# Patient Record
Sex: MALE | Race: BLACK OR AFRICAN AMERICAN | NOT HISPANIC OR LATINO | URBAN - METROPOLITAN AREA
[De-identification: names, ages, dates, MRNs, and addresses within clinical notes are randomized per-mention and may not be internally consistent; named-entity substitution may affect disease eponyms.]

---

## 2022-10-26 ENCOUNTER — INPATIENT (INPATIENT)
Facility: HOSPITAL | Age: 52
LOS: 2 days | Discharge: HOME | End: 2022-10-29
Attending: HOSPITALIST | Admitting: HOSPITALIST
Payer: MEDICAID

## 2022-10-26 VITALS
HEART RATE: 67 BPM | OXYGEN SATURATION: 100 % | RESPIRATION RATE: 18 BRPM | DIASTOLIC BLOOD PRESSURE: 92 MMHG | SYSTOLIC BLOOD PRESSURE: 170 MMHG | TEMPERATURE: 98 F

## 2022-10-26 LAB
ALBUMIN SERPL ELPH-MCNC: 4.9 G/DL — SIGNIFICANT CHANGE UP (ref 3.5–5.2)
ALP SERPL-CCNC: 61 U/L — SIGNIFICANT CHANGE UP (ref 30–115)
ALT FLD-CCNC: 10 U/L — SIGNIFICANT CHANGE UP (ref 0–41)
ANION GAP SERPL CALC-SCNC: 13 MMOL/L — SIGNIFICANT CHANGE UP (ref 7–14)
APTT BLD: 35 SEC — SIGNIFICANT CHANGE UP (ref 27–39.2)
AST SERPL-CCNC: 18 U/L — SIGNIFICANT CHANGE UP (ref 0–41)
BASE EXCESS BLDV CALC-SCNC: 3.8 MMOL/L — HIGH (ref -2–3)
BASOPHILS # BLD AUTO: 0.04 K/UL — SIGNIFICANT CHANGE UP (ref 0–0.2)
BASOPHILS NFR BLD AUTO: 0.5 % — SIGNIFICANT CHANGE UP (ref 0–1)
BILIRUB SERPL-MCNC: 0.5 MG/DL — SIGNIFICANT CHANGE UP (ref 0.2–1.2)
BLD GP AB SCN SERPL QL: SIGNIFICANT CHANGE UP
BUN SERPL-MCNC: 13 MG/DL — SIGNIFICANT CHANGE UP (ref 10–20)
CA-I SERPL-SCNC: 1.24 MMOL/L — SIGNIFICANT CHANGE UP (ref 1.15–1.33)
CALCIUM SERPL-MCNC: 9.2 MG/DL — SIGNIFICANT CHANGE UP (ref 8.4–10.5)
CHLORIDE SERPL-SCNC: 100 MMOL/L — SIGNIFICANT CHANGE UP (ref 98–110)
CO2 SERPL-SCNC: 26 MMOL/L — SIGNIFICANT CHANGE UP (ref 17–32)
CREAT SERPL-MCNC: 0.9 MG/DL — SIGNIFICANT CHANGE UP (ref 0.7–1.5)
EGFR: 101 ML/MIN/1.73M2 — SIGNIFICANT CHANGE UP
EOSINOPHIL # BLD AUTO: 0.61 K/UL — SIGNIFICANT CHANGE UP (ref 0–0.7)
EOSINOPHIL NFR BLD AUTO: 8.3 % — HIGH (ref 0–8)
GAS PNL BLDV: 138 MMOL/L — SIGNIFICANT CHANGE UP (ref 136–145)
GAS PNL BLDV: SIGNIFICANT CHANGE UP
GAS PNL BLDV: SIGNIFICANT CHANGE UP
GLUCOSE SERPL-MCNC: 72 MG/DL — SIGNIFICANT CHANGE UP (ref 70–99)
HCO3 BLDV-SCNC: 31 MMOL/L — HIGH (ref 22–29)
HCT VFR BLD CALC: 46 % — SIGNIFICANT CHANGE UP (ref 42–52)
HCT VFR BLDA CALC: 47 % — SIGNIFICANT CHANGE UP (ref 39–51)
HGB BLD CALC-MCNC: 15.8 G/DL — SIGNIFICANT CHANGE UP (ref 12.6–17.4)
HGB BLD-MCNC: 15.9 G/DL — SIGNIFICANT CHANGE UP (ref 14–18)
IMM GRANULOCYTES NFR BLD AUTO: 0.3 % — SIGNIFICANT CHANGE UP (ref 0.1–0.3)
INR BLD: 1 RATIO — SIGNIFICANT CHANGE UP (ref 0.65–1.3)
LACTATE BLDV-MCNC: 1.4 MMOL/L — SIGNIFICANT CHANGE UP (ref 0.5–2)
LYMPHOCYTES # BLD AUTO: 2.29 K/UL — SIGNIFICANT CHANGE UP (ref 1.2–3.4)
LYMPHOCYTES # BLD AUTO: 31.1 % — SIGNIFICANT CHANGE UP (ref 20.5–51.1)
MAGNESIUM SERPL-MCNC: 2.1 MG/DL — SIGNIFICANT CHANGE UP (ref 1.8–2.4)
MCHC RBC-ENTMCNC: 30 PG — SIGNIFICANT CHANGE UP (ref 27–31)
MCHC RBC-ENTMCNC: 34.6 G/DL — SIGNIFICANT CHANGE UP (ref 32–37)
MCV RBC AUTO: 86.8 FL — SIGNIFICANT CHANGE UP (ref 80–94)
MONOCYTES # BLD AUTO: 0.52 K/UL — SIGNIFICANT CHANGE UP (ref 0.1–0.6)
MONOCYTES NFR BLD AUTO: 7.1 % — SIGNIFICANT CHANGE UP (ref 1.7–9.3)
NEUTROPHILS # BLD AUTO: 3.89 K/UL — SIGNIFICANT CHANGE UP (ref 1.4–6.5)
NEUTROPHILS NFR BLD AUTO: 52.7 % — SIGNIFICANT CHANGE UP (ref 42.2–75.2)
NRBC # BLD: 0 /100 WBCS — SIGNIFICANT CHANGE UP (ref 0–0)
NT-PROBNP SERPL-SCNC: 136 PG/ML — SIGNIFICANT CHANGE UP (ref 0–300)
PARASITE BLOOD SMEAR RESULT: SIGNIFICANT CHANGE UP
PCO2 BLDV: 53 MMHG — SIGNIFICANT CHANGE UP (ref 42–55)
PH BLDV: 7.37 — SIGNIFICANT CHANGE UP (ref 7.32–7.43)
PLATELET # BLD AUTO: 188 K/UL — SIGNIFICANT CHANGE UP (ref 130–400)
PO2 BLDV: 30 MMHG — SIGNIFICANT CHANGE UP
POTASSIUM BLDV-SCNC: 3.2 MMOL/L — LOW (ref 3.5–5.1)
POTASSIUM SERPL-MCNC: 3.5 MMOL/L — SIGNIFICANT CHANGE UP (ref 3.5–5)
POTASSIUM SERPL-SCNC: 3.5 MMOL/L — SIGNIFICANT CHANGE UP (ref 3.5–5)
PROT SERPL-MCNC: 7.3 G/DL — SIGNIFICANT CHANGE UP (ref 6–8)
PROTHROM AB SERPL-ACNC: 11.4 SEC — SIGNIFICANT CHANGE UP (ref 9.95–12.87)
RBC # BLD: 5.3 M/UL — SIGNIFICANT CHANGE UP (ref 4.7–6.1)
RBC # FLD: 12.2 % — SIGNIFICANT CHANGE UP (ref 11.5–14.5)
SAO2 % BLDV: 42.3 % — SIGNIFICANT CHANGE UP
SARS-COV-2 RNA SPEC QL NAA+PROBE: SIGNIFICANT CHANGE UP
SODIUM SERPL-SCNC: 139 MMOL/L — SIGNIFICANT CHANGE UP (ref 135–146)
TROPONIN T SERPL-MCNC: <0.01 NG/ML — SIGNIFICANT CHANGE UP
WBC # BLD: 7.37 K/UL — SIGNIFICANT CHANGE UP (ref 4.8–10.8)
WBC # FLD AUTO: 7.37 K/UL — SIGNIFICANT CHANGE UP (ref 4.8–10.8)

## 2022-10-26 PROCEDURE — 93010 ELECTROCARDIOGRAM REPORT: CPT

## 2022-10-26 PROCEDURE — 71045 X-RAY EXAM CHEST 1 VIEW: CPT | Mod: 26

## 2022-10-26 PROCEDURE — 99285 EMERGENCY DEPT VISIT HI MDM: CPT

## 2022-10-26 NOTE — CHART NOTE - NSCHARTNOTEFT_GEN_A_CORE
Code STEMI was activated for this patient to which I responded right away. Pt was evaluated in the ER and EKG was reviewed. Pt did not complain of any chest pain. Pt came to [place] yesterday from [facility]. He speaks [place] language, [employer] interpret[employer] services were used for translation. Pt started getting fever with cough and rhinorrhea few days ago. However did not have any chest pain or associated SOB. Pt is also endorsing generalized weakness and body aches. EKG was done in ER which showed RBBB and LHV with TWI in anterio lateral leads. Case was discussed with interventional cardiologist on call and code STEMI was cancelled.

## 2022-10-26 NOTE — ED PROVIDER NOTE - ATTENDING APP SHARED VISIT CONTRIBUTION OF CARE
52-year-old male without past medical history, arrived from [facility] yesterday, wolef speaking only, translation obtained by brother and  online, now presents with chronic cough for years, associated with occasional back pain and chest pain specifically with cough.  Also intermittent sweating either during the day or night.  None recently.  Occasionally feels tired.  In triage EKG was done.  Which showed ischemic EKG.  STEMI code was activated.  But canceled.  Patient has no allergies, does not take in medications, did not have any surgeries in the past.    vss, nontoxic, well appearing, pink conj, anicteric, MMM, neck supple, CTAB, RRR, equal radial pulses bilat, abd soft/nt/nd, no cva tend. no calves tend, no edema, no fnd. no rashes.    Plan:  labs, imaging, meds, reassess

## 2022-10-26 NOTE — CONSULT NOTE ADULT - SUBJECTIVE AND OBJECTIVE BOX
Outpt cardiologist:    HPI:  51 Y/O M with no significant PMHx presented to the hospital for fever, cough, generalized weakness and body aches. [place] came [place] from [place] yesterday. Per pt his symptoms started few days ago however he denied any hx of chest pain, palpitations or SOB. In he ER EKG was done and Code STEMI was activated for this patient to which I responded right away. Pt was evaluated in the ER and EKG was reviewed. Pt did not complain of any chest pain. Pt came to [place] yesterday from [facility]. He speaks [employer] language, [employer] interpretor services were used for translation. Pt started getting fever with cough and rhinorrhea few days ago. However did not have any chest pain or associated SOB. Pt is also endorsing generalized weakness and body aches. EKG was done in ER which showed RBBB and LHV with TWI in anterio lateral leads. Case was discussed with interventional cardiologist on call and code STEMI was cancelled.        PAST MEDICAL & SURGICAL HISTORY  no PMH    FAMILY HISTORY:  FAMILY HISTORY:  no hx of cad    SOCIAL HISTORY:  Social History:  non smoker    ALLERGIES:  No Known Allergies      MEDICATIONS:    PRN:      HOME MEDICATIONS:  Home Medications:      VITALS:   T(F): 97.7 (10-26 @ 17:09), Max: 97.7 (10-26 @ 17:09)  HR: 67 (10-26 @ 17:09) (67 - 67)  BP: 170/92 (10-26 @ 17:09) (170/92 - 170/92)  BP(mean): --  RR: 18 (10-26 @ 17:09) (18 - 18)  SpO2: 100% (10-26 @ 17:09) (100% - 100%)    I&O's Summary      REVIEW OF SYSTEMS:  CONSTITUTIONAL: generalized weakness with fevers and chills  HEENT: No visual changes, neck/ear pain  RESPIRATORY: + cough, No sob  CARDIOVASCULAR: See HPI  GASTROINTESTINAL: No abdominal pain. No nausea, vomiting, diarrhea   GENITOURINARY: No dysuria, frequency or hematuria  NEUROLOGICAL: No new focal deficits  SKIN: No new rashes    PHYSICAL EXAM:  General: Not in distress.  .   HEENT: EOMI  Cardio: regular, S1, S2   Pulm: B/L BS.  No wheezing / crackles / rales  Abdomen: Soft, non-tender, non-distended. Normoactive bowel sounds  Extremities: No edema b/l le  Neuro: A&O x3. No focal deficits    LABS:                        15.9   7.37  )-----------( 188      ( 26 Oct 2022 17:41 )             46.0                     RADIOLOGY:  N/A  -OTHER:  ECG:  RBBB with LVH and TWI anterio lateral leads    TELEMETRY EVENTS:  None

## 2022-10-26 NOTE — ED PROVIDER NOTE - OBJECTIVE STATEMENT
52-year-old male without past medical history, arrived from [facility] yesterday, wolef speaking only, translation obtained by brother and  online, now presents with chronic cough for years, associated with occasional back pain and chest pain specifically with cough x several days.  Also intermittent sweating either during the day or night.  None recently.  Occasionally feels tired.

## 2022-10-26 NOTE — ED PROVIDER NOTE - NS ED ATTENDING STATEMENT MOD
This was a shared visit with the LATHA. I reviewed and verified the documentation and independently performed the documented:

## 2022-10-26 NOTE — ED PROVIDER NOTE - PROGRESS NOTE DETAILS
Note authored by Dr. Fisher: ED CXR prelim: No PTX, No infiltrates, No Free air.  Bedside ultrasound done normal echo.. Note authored by Dr. Fisher: Preliminary report on malaria smear is negative. MARCO: cardiology is bedside, requesting inpatient echo and further studies after pt's brother is saying he has a hx of HCOM and a pacemaker.    The prior STEMI was cancelled by cardiology earlier than this note. MARCO: cardiology is bedside, requesting inpatient echo and further studies after pt's brother is saying he has a hx of HCOM and a pacemaker (the brother, not the pt). Cardio concerned about LVHF, requesting admission.     The prior STEMI was cancelled by cardiology earlier than this note.

## 2022-10-26 NOTE — ED PROVIDER NOTE - PHYSICAL EXAMINATION
VITAL SIGNS: I have reviewed nursing notes and confirm.  CONSTITUTIONAL: Well-appearing, non-toxic, in NAD  SKIN: Warm dry, normal skin turgor  HEAD: NCAT  EYES: No conjunctival injection, scleral anicteric  ENT: Moist mucous membranes,   NECK: Supple; full ROM.   CARD: RRR, no murmurs, rubs or gallops  RESP: Clear to ausculation bilaterally.  No rales, rhonchi, or wheezing.  ABD: Soft, non-distended, non-tender, no rebound or guarding. No CVA tenderness  EXT: No pedal edema, no calf tenderness  NEURO: Normal motor, normal sensory. CN II-XII grossly intact.   PSYCH: Cooperative, appropriate.

## 2022-10-26 NOTE — ED ADULT NURSE NOTE - OBJECTIVE STATEMENT
c/o cough since more than a month . Patient just arrived [facility] . AS per patient he been getting treatment for cough and got worse recently . Denied any chest pain

## 2022-10-27 ENCOUNTER — TRANSCRIPTION ENCOUNTER (OUTPATIENT)
Age: 52
End: 2022-10-27

## 2022-10-27 LAB
A1C WITH ESTIMATED AVERAGE GLUCOSE RESULT: 5.4 % — SIGNIFICANT CHANGE UP (ref 4–5.6)
ANION GAP SERPL CALC-SCNC: 10 MMOL/L — SIGNIFICANT CHANGE UP (ref 7–14)
BASOPHILS # BLD AUTO: 0.04 K/UL — SIGNIFICANT CHANGE UP (ref 0–0.2)
BASOPHILS NFR BLD AUTO: 0.6 % — SIGNIFICANT CHANGE UP (ref 0–1)
BUN SERPL-MCNC: 15 MG/DL — SIGNIFICANT CHANGE UP (ref 10–20)
CALCIUM SERPL-MCNC: 9 MG/DL — SIGNIFICANT CHANGE UP (ref 8.4–10.5)
CHLORIDE SERPL-SCNC: 102 MMOL/L — SIGNIFICANT CHANGE UP (ref 98–110)
CHOLEST SERPL-MCNC: 207 MG/DL — HIGH
CO2 SERPL-SCNC: 27 MMOL/L — SIGNIFICANT CHANGE UP (ref 17–32)
CREAT SERPL-MCNC: 0.9 MG/DL — SIGNIFICANT CHANGE UP (ref 0.7–1.5)
D DIMER BLD IA.RAPID-MCNC: <150 NG/ML DDU — SIGNIFICANT CHANGE UP (ref 0–230)
EGFR: 101 ML/MIN/1.73M2 — SIGNIFICANT CHANGE UP
EOSINOPHIL # BLD AUTO: 0.54 K/UL — SIGNIFICANT CHANGE UP (ref 0–0.7)
EOSINOPHIL NFR BLD AUTO: 7.8 % — SIGNIFICANT CHANGE UP (ref 0–8)
ESTIMATED AVERAGE GLUCOSE: 108 MG/DL — SIGNIFICANT CHANGE UP (ref 68–114)
GLUCOSE SERPL-MCNC: 111 MG/DL — HIGH (ref 70–99)
HCT VFR BLD CALC: 45.6 % — SIGNIFICANT CHANGE UP (ref 42–52)
HDLC SERPL-MCNC: 61 MG/DL — SIGNIFICANT CHANGE UP
HGB BLD-MCNC: 15.7 G/DL — SIGNIFICANT CHANGE UP (ref 14–18)
IMM GRANULOCYTES NFR BLD AUTO: 0.3 % — SIGNIFICANT CHANGE UP (ref 0.1–0.3)
LIPID PNL WITH DIRECT LDL SERPL: 135 MG/DL — HIGH
LYMPHOCYTES # BLD AUTO: 1.85 K/UL — SIGNIFICANT CHANGE UP (ref 1.2–3.4)
LYMPHOCYTES # BLD AUTO: 26.8 % — SIGNIFICANT CHANGE UP (ref 20.5–51.1)
MCHC RBC-ENTMCNC: 30.1 PG — SIGNIFICANT CHANGE UP (ref 27–31)
MCHC RBC-ENTMCNC: 34.4 G/DL — SIGNIFICANT CHANGE UP (ref 32–37)
MCV RBC AUTO: 87.5 FL — SIGNIFICANT CHANGE UP (ref 80–94)
MONOCYTES # BLD AUTO: 0.5 K/UL — SIGNIFICANT CHANGE UP (ref 0.1–0.6)
MONOCYTES NFR BLD AUTO: 7.2 % — SIGNIFICANT CHANGE UP (ref 1.7–9.3)
NEUTROPHILS # BLD AUTO: 3.95 K/UL — SIGNIFICANT CHANGE UP (ref 1.4–6.5)
NEUTROPHILS NFR BLD AUTO: 57.3 % — SIGNIFICANT CHANGE UP (ref 42.2–75.2)
NON HDL CHOLESTEROL: 146 MG/DL — HIGH
NRBC # BLD: 0 /100 WBCS — SIGNIFICANT CHANGE UP (ref 0–0)
PLATELET # BLD AUTO: 169 K/UL — SIGNIFICANT CHANGE UP (ref 130–400)
POTASSIUM SERPL-MCNC: 4.4 MMOL/L — SIGNIFICANT CHANGE UP (ref 3.5–5)
POTASSIUM SERPL-SCNC: 4.4 MMOL/L — SIGNIFICANT CHANGE UP (ref 3.5–5)
RBC # BLD: 5.21 M/UL — SIGNIFICANT CHANGE UP (ref 4.7–6.1)
RBC # FLD: 12.3 % — SIGNIFICANT CHANGE UP (ref 11.5–14.5)
SODIUM SERPL-SCNC: 139 MMOL/L — SIGNIFICANT CHANGE UP (ref 135–146)
TRIGL SERPL-MCNC: 57 MG/DL — SIGNIFICANT CHANGE UP
WBC # BLD: 6.9 K/UL — SIGNIFICANT CHANGE UP (ref 4.8–10.8)
WBC # FLD AUTO: 6.9 K/UL — SIGNIFICANT CHANGE UP (ref 4.8–10.8)

## 2022-10-27 PROCEDURE — 99223 1ST HOSP IP/OBS HIGH 75: CPT

## 2022-10-27 PROCEDURE — 71275 CT ANGIOGRAPHY CHEST: CPT | Mod: 26

## 2022-10-27 RX ORDER — ACETAMINOPHEN 500 MG
650 TABLET ORAL EVERY 6 HOURS
Refills: 0 | Status: DISCONTINUED | OUTPATIENT
Start: 2022-10-27 | End: 2022-10-29

## 2022-10-27 RX ORDER — LANOLIN ALCOHOL/MO/W.PET/CERES
3 CREAM (GRAM) TOPICAL AT BEDTIME
Refills: 0 | Status: DISCONTINUED | OUTPATIENT
Start: 2022-10-27 | End: 2022-10-29

## 2022-10-27 RX ORDER — AMLODIPINE BESYLATE 2.5 MG/1
5 TABLET ORAL DAILY
Refills: 0 | Status: DISCONTINUED | OUTPATIENT
Start: 2022-10-27 | End: 2022-10-28

## 2022-10-27 RX ORDER — PANTOPRAZOLE SODIUM 20 MG/1
40 TABLET, DELAYED RELEASE ORAL
Refills: 0 | Status: DISCONTINUED | OUTPATIENT
Start: 2022-10-27 | End: 2022-10-29

## 2022-10-27 RX ORDER — LABETALOL HCL 100 MG
200 TABLET ORAL ONCE
Refills: 0 | Status: COMPLETED | OUTPATIENT
Start: 2022-10-27 | End: 2022-10-27

## 2022-10-27 RX ORDER — ENOXAPARIN SODIUM 100 MG/ML
40 INJECTION SUBCUTANEOUS EVERY 24 HOURS
Refills: 0 | Status: DISCONTINUED | OUTPATIENT
Start: 2022-10-27 | End: 2022-10-29

## 2022-10-27 RX ADMIN — Medication 650 MILLIGRAM(S): at 22:05

## 2022-10-27 RX ADMIN — Medication 200 MILLIGRAM(S): at 14:47

## 2022-10-27 RX ADMIN — AMLODIPINE BESYLATE 5 MILLIGRAM(S): 2.5 TABLET ORAL at 12:24

## 2022-10-27 RX ADMIN — ENOXAPARIN SODIUM 40 MILLIGRAM(S): 100 INJECTION SUBCUTANEOUS at 14:47

## 2022-10-27 NOTE — DISCHARGE NOTE PROVIDER - PROVIDER TOKENS
PROVIDER:[TOKEN:[[id number]]],PROVIDER:[TOKEN:[[id number]]],PROVIDER:[TOKEN:[[id number]]]

## 2022-10-27 NOTE — H&P ADULT - NSHPLABSRESULTS_GEN_ALL_CORE
CBC:            15.9   7.37  )-----------( 188      ( 10-26-22 @ 17:41 )             46.0         Chem:         ( 10-26-22 @ 17:41 )    139  |  100  |  13  ----------------------------<  72  3.5   |  26  |  0.9        Liver Functions: ( 10-26-22 @ 17:41 )  Alb: 4.9 g/dL / Pro: 7.3 g/dL / ALK PHOS: 61 U/L / ALT: 10 U/L / AST: 18 U/L / GGT: x    Malarial Parasite : Negative blood smear

## 2022-10-27 NOTE — DISCHARGE NOTE PROVIDER - NSDCCPCAREPLAN_GEN_ALL_CORE_FT
PRINCIPAL DISCHARGE DIAGNOSIS  Diagnosis: Cough  Assessment and Plan of Treatment: Cough  Coughing is a reflex that clears your throat and your airways. Coughing helps to heal and protect your lungs. It is normal to cough occasionally, but a cough that happens with other symptoms or lasts a long time may be a sign of a condition that needs treatment. Coughing may be caused by infections, asthma or COPD, smoking, postnasal drip, gastroesophageal reflux, as well as other medical conditions. Take medicines only as instructed by your health care provider. Avoid environments or triggers that causes you to cough at work or at home.  SEEK IMMEDIATE MEDICAL CARE IF YOU HAVE ANY OF THE FOLLOWING SYMPTOMS: coughing up blood, shortness of breath, rapid heart rate, chest pain, unexplained weight loss or night sweats.  Please follow up with your PCP within 1 week         PRINCIPAL DISCHARGE DIAGNOSIS  Diagnosis: Cough  Assessment and Plan of Treatment: Cough with Lung Nodules. Possible recent Infection however unclear and will to follow up with Pulmonary in Clinic to make sure there is no suspecion for cancer or other infections endemic to [place].   - follow up the rest of your results with Pulmonary and Medicine Clinic  - Medicine clinic will call you to give a Primary Care appointment   - You will need to call for Pulmonary appointment yourself  YOU WERE FOUND TO HAVE   ACCELERATED HYPERTENSION W/ AORTIC ROOT DIALATION AND EVIDENCE OF GRADE 1 DYASTOLIC DYSNFUNCTION BUT NO EVIDENCE OF OVERT HEART FAILURE.   - YOU HAVE HIGH BLOOD PRESSURE AND EVIDENCE OF HEART CHANGES FROM UNTREATED HTN. YOU MUST TAKE ALL MEDS AND FOLLOW UP WITH PRIMARY CARE AND CARDIOLOGY FOR FURTHER CARE IN THE OFFICE  - MEDICINE CLINIC WILL CALL YOU WITH AN APPOINTMENT  - YOU WILL NEED TO MAKE CARDIOLOGY APPOINTMENT YOURSELF  Coughing is a reflex that clears your throat and your airways. Coughing helps to heal and protect your lungs. It is normal to cough occasionally, but a cough that happens with other symptoms or lasts a long time may be a sign of a condition that needs treatment. Coughing may be caused by infections, asthma or COPD, smoking, postnasal drip, gastroesophageal reflux, as well as other medical conditions. Take medicines only as instructed by your health care provider. Avoid environments or triggers that causes you to cough at work or at home.  SEEK IMMEDIATE MEDICAL CARE IF YOU HAVE ANY OF THE FOLLOWING SYMPTOMS: coughing up blood, shortness of breath, rapid heart rate, chest pain, unexplained weight loss or night sweats.  Please follow up with your PCP within 1 week         PRINCIPAL DISCHARGE DIAGNOSIS  Diagnosis: Cough  Assessment and Plan of Treatment: Cough with Lung Nodules. Possible recent Infection however unclear and will to follow up with Pulmonary in Clinic to make sure there is no suspecion for cancer or other infections endemic to [place].   - follow up the rest of your results with Pulmonary and Medicine Clinic  - Medicine clinic will call you to give a Primary Care appointment   - You will need to call for Pulmonary appointment yourself  YOU WERE FOUND TO HAVE   ACCELERATED HYPERTENSION W/ AORTIC ROOT DIALATION AND EVIDENCE OF GRADE 1 DYASTOLIC DYSNFUNCTION BUT NO EVIDENCE OF OVERT HEART FAILURE.   - YOU HAVE HIGH BLOOD PRESSURE AND EVIDENCE OF HEART CHANGES FROM UNTREATED HTN. YOU MUST TAKE ALL MEDS AND FOLLOW UP WITH PRIMARY CARE AND CARDIOLOGY FOR FURTHER CARE IN THE OFFICE  - MEDICINE CLINIC WILL CALL YOU WITH AN APPOINTMENT  - YOU WILL NEED TO MAKE CARDIOLOGY APPOINTMENT YOURSELF  - DO NOT USE ANY SALT IN YOUR FOOD THIS WORSENS BLOOD PRESSURE   Coughing is a reflex that clears your throat and your airways. Coughing helps to heal and protect your lungs. It is normal to cough occasionally, but a cough that happens with other symptoms or lasts a long time may be a sign of a condition that needs treatment. Coughing may be caused by infections, asthma or COPD, smoking, postnasal drip, gastroesophageal reflux, as well as other medical conditions. Take medicines only as instructed by your health care provider. Avoid environments or triggers that causes you to cough at work or at home.  SEEK IMMEDIATE MEDICAL CARE IF YOU HAVE ANY OF THE FOLLOWING SYMPTOMS: coughing up blood, shortness of breath, rapid heart rate, chest pain, unexplained weight loss or night sweats.  Please follow up with your PCP within 1 week         PRINCIPAL DISCHARGE DIAGNOSIS  Diagnosis: Cough  Assessment and Plan of Treatment: Cough with Lung Nodules. Possible recent Infection however unclear and will to follow up with Pulmonary in Clinic to make sure there is no suspecion for cancer or other infections endemic to [place].   - follow up the rest of your results with Pulmonary and Medicine Clinic  - Medicine clinic will call you to give a Primary Care appointment   - You will need to call for Pulmonary appointment yourself  YOU WERE FOUND TO HAVE   ACCELERATED HYPERTENSION W/ AORTIC ROOT DIALATION AND EVIDENCE OF GRADE 1 DYASTOLIC DYSNFUNCTION BUT NO EVIDENCE OF OVERT HEART FAILURE.   STEMI RULED OUT NO HEART ATTACK   - YOU HAVE HIGH BLOOD PRESSURE AND EVIDENCE OF HEART CHANGES FROM UNTREATED HTN. YOU MUST TAKE ALL MEDS AND FOLLOW UP WITH PRIMARY CARE AND CARDIOLOGY FOR FURTHER CARE IN THE OFFICE  - MEDICINE CLINIC WILL CALL YOU WITH AN APPOINTMENT  - YOU WILL NEED TO MAKE CARDIOLOGY APPOINTMENT YOURSELF  - DO NOT USE ANY SALT IN YOUR FOOD THIS WORSENS BLOOD PRESSURE   Coughing is a reflex that clears your throat and your airways. Coughing helps to heal and protect your lungs. It is normal to cough occasionally, but a cough that happens with other symptoms or lasts a long time may be a sign of a condition that needs treatment. Coughing may be caused by infections, asthma or COPD, smoking, postnasal drip, gastroesophageal reflux, as well as other medical conditions. Take medicines only as instructed by your health care provider. Avoid environments or triggers that causes you to cough at work or at home.  SEEK IMMEDIATE MEDICAL CARE IF YOU HAVE ANY OF THE FOLLOWING SYMPTOMS: coughing up blood, shortness of breath, rapid heart rate, chest pain, unexplained weight loss or night sweats.  Please follow up with your PCP within 1 week

## 2022-10-27 NOTE — H&P ADULT - ASSESSMENT
A 52 yrs/M with no significant pmhx presented to the ED with complains of fever, cough, generalized weakness and body aches for last few days. Pt came to [place] from [place] on 10/25/2022. Pt was evaluated in the ED and EKG was done which showed RBBB and LHV with T wave inversion in marylu lateral leads. Pt did not complain of any chest pain.     #Fever with cough:  -COVID-19 negative  -Malarial parasite blood smear: appears to be negative for intracellular and extracellular malarial parasites   -r/o viral etiology  -monitor off antibiotics  -Trend CBC  -Monitor vitals    #LVH and T-wave inversion on EKG:  -Code STEMI called in ED, which was later cancelled  -No chest pain at presentation and subsequent evaluation  -Serum Troponin T <0.01  -  -Cardiology consulted: Telemetry monitoring advised  -F/U serial EKGs  -F/U CXR  -Trend Trops  -F/U lipid profile, HbA1c  -F/U 2 D echo to r/o HCM given LVH with T-wave Inversion in marylu lateral leads    #MISC:  -DVT prophylaxis: Lovenox  -GI prophylaxis: Protonix  -Activity: IAT  -Diet: Regular diet  -Code status: Full code A 52 yrs/M with no significant pmhx presented to the ED with complains of fever, cough, generalized weakness and body aches for last few days. Pt came to [place] from [place] on 10/25/2022. Pt was evaluated in the ED and EKG was done which showed RBBB and LHV with T wave inversion in marylu lateral leads. Pt did not complain of any chest pain.     #Fever with cough:  -No fever spike since presentation  -COVID-19 negative  -Malarial parasite blood smear: appears to be negative for intracellular and extracellular malarial parasites   -r/o viral etiology  -monitor off antibiotics  -Trend CBC  -Monitor vitals    #LVH and T-wave inversion on EKG:  -Code STEMI called in ED, which was later cancelled  -No chest pain at presentation and subsequent evaluation  -Serum Troponin T <0.01  -  -Cardiology consulted: Telemetry monitoring advised  -F/U serial EKGs  -F/U CXR  -Trend Trops  -F/U lipid profile, HbA1c  -F/U 2 D echo to r/o HCM given LVH with T-wave Inversion in marylu lateral leads    #MISC:  -DVT prophylaxis: Lovenox  -GI prophylaxis: Protonix  -Activity: IAT  -Diet: Regular diet  -Code status: Full code A 52 yrs/M with no significant pmhx presented to the ED with complains of fever, cough, generalized weakness and body aches for last few days. Pt came to [place] from [place] on 10/25/2022. Pt was evaluated in the ED and EKG was done which showed RBBB and LHV with T wave inversion in marylu lateral leads. Pt did not complain of any chest pain.     #Fever with cough:  -No fever spike since presentation  -COVID-19 negative  -Malarial parasite blood smear: appears to be negative for intracellular and extracellular malarial parasites   -r/o viral etiology  -monitor off antibiotics  -Trend CBC  -Monitor vitals    #LVH and T-wave inversion on EKG:  -Code STEMI called in ED, which was later cancelled  -No chest pain at presentation and subsequent evaluation  -Serum Troponin T <0.01  -  -Cardiology consulted: Telemetry monitoring advised  -F/U serial EKGs  -F/U CXR  -Trend Trops  -F/U lipid profile, HbA1c  -F/U 2 D echo to r/o HCM given LVH with T-wave Inversion in marylu lateral leads    #High BP on admission:  -/84 initially  -monitor BP   -consider anti-hypertensive if constantly high    #?Hemorrhoids:  -patient thinks he has on and off symptoms of hemorrhoid  -Never been evaluated by a medical practicioner  -takes Daflon 1000mg if symptomatic    #MISC:  -DVT prophylaxis: Lovenox  -GI prophylaxis: Protonix  -Activity: IAT  -Diet: Regular diet  -Code status: Full code

## 2022-10-27 NOTE — DISCHARGE NOTE PROVIDER - HOSPITAL COURSE
ED COURSE:    A 52 yrs/M with no significant pmhx presented to the ED with complains of fever, cough, generalized weakness and body aches for last few days. [place] came [place] from [place] on 10/25/2022. Pt was evaluated in the ED and EKG was done which showed RBBB and LHV with T wave inversion in marylu lateral leads. Pt did not complain of any chest pain. He speaks English language, interpretor services were used for translation. Pt started getting fever with cough and rhinorrhea few days ago. However did not have any chest pain or associated SOB. Pt is also endorsing generalized weakness and body aches. Because of the EKG findings, cod STEMI was called and cardio was consulted. Later, code STEMI was cancelled.  Patient denies headache, abdominal pain, loss of consciousness, hemoptysis, SOB, dysuria or vomiting.    In the ED:  T(C): 36.4 (10-26-22 @ 23:32), Max: 36.5 (10-26-22 @ 17:09)  HR: 62 (10-26-22 @ 23:32) (62 - 67)  BP: 172/84 (10-26-22 @ 23:32) (156/88 - 172/84)  RR: 18 (10-26-22 @ 23:32) (18 - 18)  SpO2: 100% (10-26-22 @ 23:32) (99% - 100%)      Hospital Course:    Pt was admitted to medicine for further work up.    Cardio recommended an ECHO/Serial EKGs.     RVP was sent and ----      Pt can be discharged with follow up with PCP and cardiology outpatient. ED COURSE:    A 52 yrs/M with no significant pmhx presented to the ED with complains of fever, cough, generalized weakness and body aches for last few days. [place] came [place] from [place] on 10/25/2022. Pt was evaluated in the ED and EKG was done which showed RBBB and LHV with T wave inversion in marylu lateral leads. Pt did not complain of any chest pain. He speaks [place] language, interpretor services were used for translation. Pt started getting fever with cough and rhinorrhea few days ago. However did not have any chest pain or associated SOB. Pt is also endorsing generalized weakness and body aches. Because of the EKG findings, cod STEMI was called and cardio was consulted. Later, code STEMI was cancelled.  Patient denies headache, abdominal pain, loss of consciousness, hemoptysis, SOB, dysuria or vomiting.    Vital Signs Last 24 Hrs  T(C): 36.1 (29 Oct 2022 05:45), Max: 36.7 (28 Oct 2022 13:03)  T(F): 97 (29 Oct 2022 05:45), Max: 98.1 (28 Oct 2022 13:03)  HR: 85 (29 Oct 2022 06:54) (68 - 87)  BP: 141/82 (29 Oct 2022 06:54) (141/82 - 184/98)  BP(mean): 126 (28 Oct 2022 18:50) (126 - 126)  RR: 18 (28 Oct 2022 19:37) (18 - 18)  SpO2: 97% (28 Oct 2022 20:45) (97% - 98%)    Parameters below as of 28 Oct 2022 20:45  Patient On (Oxygen Delivery Method): room air    GEN: NAD  CV: NL S1/2  resp: CTA B/L  GI: +BS Soft NT ND  Ext: No e/c/c   MS: Aox3      Hospital Course:    Pt was admitted to medicine for further work up.    Cardio recommended an ECHO/Serial EKGs.     RVP was sent and Neg     Pt can be discharged with follow up with PCP and cardiology outpatient.  - As BP improved   - No new events overnight   - c/w all meds per orders

## 2022-10-27 NOTE — DISCHARGE NOTE PROVIDER - CARE PROVIDER_API CALL
Kevin Nielsen)  Cardiovascular Disease; Internal Medicine  [address], [address]  [place] [address]  Phone: [phone]  Fax: [phone]  Follow Up Time:     Keo Pruitt)  Internal Medicine  [address]  [place] [address]  Phone: [phone]  Fax: [phone]  Follow Up Time:     Shiv Thurston)  Critical Care Medicine; Internal Medicine; Pulmonary Disease; Sleep Medicine  [address] [place] [address]  Phone: [phone]  Fax: [phone]  Follow Up Time:

## 2022-10-27 NOTE — DISCHARGE NOTE PROVIDER - NSDCMRMEDTOKEN_GEN_ALL_CORE_FT
Tessalon Perles 100 mg oral capsule: 1 cap(s) orally 3 times a day as needed for cough.    hydroCHLOROthiazide 25 mg oral tablet: 1 tab(s) orally once a day  NIFEdipine 30 mg oral tablet, extended release: 1 tab(s) orally once a day  Tessalon Perles 100 mg oral capsule: 1 cap(s) orally 3 times a day as needed for cough.

## 2022-10-27 NOTE — H&P ADULT - ATTENDING COMMENTS
#Chest pain, pleuritic  with associated chronic cough >1 year  ce neg  ekg with RBBB  check tte  f/u cards  wells score 0; check d-dimer, va duplex  #Cough, chronic  cxr clear  no hypoxia  outpt pmd f/u  check rvp    #Progress Note Handoff:  Pending (specify):  Consults_________, Tests________, Test Results_______, Other__tte, rvp_______  Family discussion: d/w pt, brother at bedside re: treatment plan, primary dx  Disposition: Home__x_/SNF___/Other________/Unknown at this time________ #Chest pain, pleuritic  with associated chronic cough >1 year  ce neg  ekg with RBBB  check tte  f/u cards  +recent travel from [facility]  wells score 3; check cta chest  d-dimer  #Cough, chronic  cxr clear  no hypoxia  outpt pmd f/u  check rvp    #Progress Note Handoff:  Pending (specify):  Consults_________, Tests________, Test Results_______, Other__cta chest_______  Family discussion: d/w pt, brother at bedside re: treatment plan, primary dx  Disposition: Home__x_/SNF___/Other________/Unknown at this time________

## 2022-10-27 NOTE — H&P ADULT - HISTORY OF PRESENT ILLNESS
A 52 yrs/M with no significant pmhx presented to the ED with complains of fever, cough, generalized weakness and body aches for last few days. [place]25/[place]22. Pt was evaluated in the ED and EKG was done which showed RBBB and LHV with T wave inversion in marylu lateral leads. Pt did not complain of any chest pain. He speaks Language [employer] language, [employer] interpretor services were used for translation. Pt started getting fever with cough and rhinorrhea few days ago. However did not have any chest pain or associated SOB. Pt is also endorsing generalized weakness and body aches. EKG was done in ER which showed RBBB and LHV with TWI in anterio lateral leads. Case was discussed with interventional cardiologist on call and code STEMI was cancelled.   A 52 yrs/M with no significant pmhx presented to the ED with complains of fever, cough, generalized weakness and body aches for last few days. [place] from [place] on 10/25/2022. Pt was evaluated in the ED and EKG was done which showed RBBB and LHV with T wave inversion in marylu lateral leads. Pt did not complain of any chest pain. He speaks [place] language, pacific interpretor services were used for translation. Pt started getting fever with cough and rhinorrhea few days ago. However did not have any chest pain or associated SOB. Pt is also endorsing generalized weakness and body aches. Because of the EKG findings, cod STEMI was called and cardio was consulted. Later, code STEMI was cancelled.  Patient denies headache, abdominal pain, loss of consciousness, hemoptysis, SOB, dysuria or vomiting.    In the ED:  T(C): 36.4 (10-26-22 @ 23:32), Max: 36.5 (10-26-22 @ 17:09)  HR: 62 (10-26-22 @ 23:32) (62 - 67)  BP: 172/84 (10-26-22 @ 23:32) (156/88 - 172/84)  RR: 18 (10-26-22 @ 23:32) (18 - 18)  SpO2: 100% (10-26-22 @ 23:32) (99% - 100%)    CONSTITUTIONAL: Well groomed, no apparent distress  EYES: PERRLA and symmetric, EOMI, No conjunctival or scleral injection, non-icteric  ENMT: Oral mucosa with moist membranes. Normal dentition; no pharyngeal injection or exudates             NECK: Supple, symmetric and without tracheal deviation   RESP: No respiratory distress, no use of accessory muscles; CTA b/l, no WRR  CV: RRR, +S1S2, no MRG; no JVD; no peripheral edema  GI: Soft, NT, ND, no rebound, no guarding; no palpable masses; no hepatosplenomegaly; no hernia palpated  LYMPH: No cervical LAD or tenderness; no axillary LAD or tenderness; no inguinal LAD or tenderness  MSK: Normal gait; No digital clubbing or cyanosis; examination of the (head/neck/spine/ribs/pelvis, RUE, LUE, RLE, LLE) without misalignment,            Normal ROM without pain, no spinal tenderness, normal muscle strength/tone  SKIN: No rashes or ulcers noted; no subcutaneous nodules or induration palpable  NEURO: CN II-XII intact; normal reflexes in upper and lower extremities, sensation intact in upper and lower extremities b/l to light touch   PSYCH: Appropriate insight/judgment; A+O x 3, mood and affect appropriate, recent/remote memory intact   A 52 yrs/M with no significant pmhx presented to the ED with complains of fever, cough, generalized weakness and body aches for last few days. [place] 10/25/2022. Pt was evaluated in the ED and EKG was done which showed RBBB and LHV with T wave inversion in marylu lateral leads. Pt did not complain of any chest pain. He speaks Telugu language, interpretor services were used for translation. Pt started getting fever with cough and rhinorrhea few days ago. However did not have any chest pain or associated SOB. Pt is also endorsing generalized weakness and body aches. Because of the EKG findings, cod STEMI was called and cardio was consulted. Later, code STEMI was cancelled.  Patient denies headache, abdominal pain, loss of consciousness, hemoptysis, SOB, dysuria or vomiting.    In the ED:  T(C): 36.4 (10-26-22 @ 23:32), Max: 36.5 (10-26-22 @ 17:09)  HR: 62 (10-26-22 @ 23:32) (62 - 67)  BP: 172/84 (10-26-22 @ 23:32) (156/88 - 172/84)  RR: 18 (10-26-22 @ 23:32) (18 - 18)  SpO2: 100% (10-26-22 @ 23:32) (99% - 100%)    CONSTITUTIONAL: Well groomed, no apparent distress  EYES: PERRLA and symmetric, EOMI, No conjunctival or scleral injection, non-icteric  ENMT: Oral mucosa with moist membranes. Normal dentition; no pharyngeal injection or exudates             NECK: Supple, symmetric and without tracheal deviation   RESP: No respiratory distress, no use of accessory muscles; CTA b/l, no WRR  CV: RRR, +S1S2, no MRG; no JVD; no peripheral edema  GI: Soft, NT, ND, no rebound, no guarding; no palpable masses; no hepatosplenomegaly; no hernia palpated  LYMPH: No cervical LAD or tenderness; no axillary LAD or tenderness; no inguinal LAD or tenderness  MSK: Normal gait; No digital clubbing or cyanosis; examination of the (head/neck/spine/ribs/pelvis, RUE, LUE, RLE, LLE) without misalignment,            Normal ROM without pain, no spinal tenderness, normal muscle strength/tone  SKIN: No rashes or ulcers noted; no subcutaneous nodules or induration palpable  NEURO: CN II-XII intact; normal reflexes in upper and lower extremities, sensation intact in upper and lower extremities b/l to light touch   PSYCH: Appropriate insight/judgment; A+O x 3, mood and affect appropriate, recent/remote memory intact

## 2022-10-27 NOTE — H&P ADULT - NSHPPHYSICALEXAM_GEN_ALL_CORE
CONSTITUTIONAL: Well groomed, no apparent distress  EYES: PERRLA and symmetric, EOMI, No conjunctival or scleral injection, non-icteric  ENMT: Oral mucosa with moist membranes. Normal dentition; no pharyngeal injection or exudates             NECK: Supple, symmetric and without tracheal deviation   RESP: No respiratory distress, no use of accessory muscles; CTA b/l, no WRR  CV: RRR, +S1S2, no MRG; no JVD; no peripheral edema  GI: Soft, NT, ND, no rebound, no guarding; no palpable masses; no hepatosplenomegaly; no hernia palpated  LYMPH: No cervical LAD or tenderness; no axillary LAD or tenderness; no inguinal LAD or tenderness  MSK: Normal gait; No digital clubbing or cyanosis; examination of the (head/neck/spine/ribs/pelvis, RUE, LUE, RLE, LLE) without misalignment,            Normal ROM without pain, no spinal tenderness, normal muscle strength/tone  SKIN: No rashes or ulcers noted; no subcutaneous nodules or induration palpable  NEURO: CN II-XII intact; normal reflexes in upper and lower extremities, sensation intact in upper and lower extremities b/l to light touch   PSYCH: Appropriate insight/judgment; A+O x 3, mood and affect appropriate, recent/remote memory intact

## 2022-10-27 NOTE — H&P ADULT - NSHPREVIEWOFSYSTEMS_GEN_ALL_CORE
CONSTITUTIONAL: generalized weakness with fevers and chills  HEENT: No visual changes, neck/ear pain  RESPIRATORY: + cough, No sob  CARDIOVASCULAR: No chest pain  GASTROINTESTINAL: No abdominal pain. No nausea, vomiting, diarrhea   GENITOURINARY: No dysuria, frequency or hematuria  NEUROLOGICAL: No new focal deficits  SKIN: No new rashes

## 2022-10-28 LAB
ALBUMIN SERPL ELPH-MCNC: 4.4 G/DL — SIGNIFICANT CHANGE UP (ref 3.5–5.2)
ALP SERPL-CCNC: 60 U/L — SIGNIFICANT CHANGE UP (ref 30–115)
ALT FLD-CCNC: 10 U/L — SIGNIFICANT CHANGE UP (ref 0–41)
ANION GAP SERPL CALC-SCNC: 12 MMOL/L — SIGNIFICANT CHANGE UP (ref 7–14)
AST SERPL-CCNC: 14 U/L — SIGNIFICANT CHANGE UP (ref 0–41)
BASOPHILS # BLD AUTO: 0.04 K/UL — SIGNIFICANT CHANGE UP (ref 0–0.2)
BASOPHILS NFR BLD AUTO: 0.7 % — SIGNIFICANT CHANGE UP (ref 0–1)
BILIRUB SERPL-MCNC: 0.6 MG/DL — SIGNIFICANT CHANGE UP (ref 0.2–1.2)
BUN SERPL-MCNC: 8 MG/DL — LOW (ref 10–20)
CALCIUM SERPL-MCNC: 9 MG/DL — SIGNIFICANT CHANGE UP (ref 8.4–10.4)
CHLORIDE SERPL-SCNC: 100 MMOL/L — SIGNIFICANT CHANGE UP (ref 98–110)
CO2 SERPL-SCNC: 27 MMOL/L — SIGNIFICANT CHANGE UP (ref 17–32)
CREAT SERPL-MCNC: 0.8 MG/DL — SIGNIFICANT CHANGE UP (ref 0.7–1.5)
EGFR: 105 ML/MIN/1.73M2 — SIGNIFICANT CHANGE UP
EOSINOPHIL # BLD AUTO: 0.42 K/UL — SIGNIFICANT CHANGE UP (ref 0–0.7)
EOSINOPHIL NFR BLD AUTO: 7.4 % — SIGNIFICANT CHANGE UP (ref 0–8)
GLUCOSE SERPL-MCNC: 129 MG/DL — HIGH (ref 70–99)
HCT VFR BLD CALC: 46.1 % — SIGNIFICANT CHANGE UP (ref 42–52)
HGB BLD-MCNC: 16 G/DL — SIGNIFICANT CHANGE UP (ref 14–18)
IMM GRANULOCYTES NFR BLD AUTO: 0.2 % — SIGNIFICANT CHANGE UP (ref 0.1–0.3)
LYMPHOCYTES # BLD AUTO: 1.55 K/UL — SIGNIFICANT CHANGE UP (ref 1.2–3.4)
LYMPHOCYTES # BLD AUTO: 27.2 % — SIGNIFICANT CHANGE UP (ref 20.5–51.1)
MCHC RBC-ENTMCNC: 30.2 PG — SIGNIFICANT CHANGE UP (ref 27–31)
MCHC RBC-ENTMCNC: 34.7 G/DL — SIGNIFICANT CHANGE UP (ref 32–37)
MCV RBC AUTO: 87 FL — SIGNIFICANT CHANGE UP (ref 80–94)
MONOCYTES # BLD AUTO: 0.36 K/UL — SIGNIFICANT CHANGE UP (ref 0.1–0.6)
MONOCYTES NFR BLD AUTO: 6.3 % — SIGNIFICANT CHANGE UP (ref 1.7–9.3)
NEUTROPHILS # BLD AUTO: 3.32 K/UL — SIGNIFICANT CHANGE UP (ref 1.4–6.5)
NEUTROPHILS NFR BLD AUTO: 58.2 % — SIGNIFICANT CHANGE UP (ref 42.2–75.2)
NRBC # BLD: 0 /100 WBCS — SIGNIFICANT CHANGE UP (ref 0–0)
PLATELET # BLD AUTO: 166 K/UL — SIGNIFICANT CHANGE UP (ref 130–400)
POTASSIUM SERPL-MCNC: 3.5 MMOL/L — SIGNIFICANT CHANGE UP (ref 3.5–5)
POTASSIUM SERPL-SCNC: 3.5 MMOL/L — SIGNIFICANT CHANGE UP (ref 3.5–5)
PROT SERPL-MCNC: 7.2 G/DL — SIGNIFICANT CHANGE UP (ref 6–8)
RBC # BLD: 5.3 M/UL — SIGNIFICANT CHANGE UP (ref 4.7–6.1)
RBC # FLD: 12.5 % — SIGNIFICANT CHANGE UP (ref 11.5–14.5)
SODIUM SERPL-SCNC: 139 MMOL/L — SIGNIFICANT CHANGE UP (ref 135–146)
TROPONIN T SERPL-MCNC: <0.01 NG/ML — SIGNIFICANT CHANGE UP
WBC # BLD: 5.7 K/UL — SIGNIFICANT CHANGE UP (ref 4.8–10.8)
WBC # FLD AUTO: 5.7 K/UL — SIGNIFICANT CHANGE UP (ref 4.8–10.8)

## 2022-10-28 PROCEDURE — 93306 TTE W/DOPPLER COMPLETE: CPT | Mod: 26

## 2022-10-28 PROCEDURE — 99233 SBSQ HOSP IP/OBS HIGH 50: CPT

## 2022-10-28 RX ORDER — NIFEDIPINE 30 MG
30 TABLET, EXTENDED RELEASE 24 HR ORAL ONCE
Refills: 0 | Status: COMPLETED | OUTPATIENT
Start: 2022-10-28 | End: 2022-10-28

## 2022-10-28 RX ORDER — NIFEDIPINE 30 MG
30 TABLET, EXTENDED RELEASE 24 HR ORAL DAILY
Refills: 0 | Status: DISCONTINUED | OUTPATIENT
Start: 2022-10-28 | End: 2022-10-29

## 2022-10-28 RX ORDER — HYDROCHLOROTHIAZIDE 25 MG
25 TABLET ORAL DAILY
Refills: 0 | Status: DISCONTINUED | OUTPATIENT
Start: 2022-10-28 | End: 2022-10-29

## 2022-10-28 RX ORDER — AMLODIPINE BESYLATE 2.5 MG/1
10 TABLET ORAL DAILY
Refills: 0 | Status: DISCONTINUED | OUTPATIENT
Start: 2022-10-28 | End: 2022-10-28

## 2022-10-28 RX ORDER — INFLUENZA VIRUS VACCINE 15; 15; 15; 15 UG/.5ML; UG/.5ML; UG/.5ML; UG/.5ML
0.5 SUSPENSION INTRAMUSCULAR ONCE
Refills: 0 | Status: DISCONTINUED | OUTPATIENT
Start: 2022-10-28 | End: 2022-10-29

## 2022-10-28 RX ADMIN — ENOXAPARIN SODIUM 40 MILLIGRAM(S): 100 INJECTION SUBCUTANEOUS at 14:03

## 2022-10-28 RX ADMIN — Medication 30 MILLIGRAM(S): at 19:09

## 2022-10-28 RX ADMIN — PANTOPRAZOLE SODIUM 40 MILLIGRAM(S): 20 TABLET, DELAYED RELEASE ORAL at 05:20

## 2022-10-28 RX ADMIN — AMLODIPINE BESYLATE 5 MILLIGRAM(S): 2.5 TABLET ORAL at 05:20

## 2022-10-28 NOTE — PROGRESS NOTE ADULT - SUBJECTIVE AND OBJECTIVE BOX
LEXI GORMAN  52y  Male    Patient is a 52y old  Male who presents with a chief complaint of Chest pain, fever, cough (27 Oct 2022 11:33)      OVERNIGHT EVENTS: high BP     PAST MEDICAL & SURGICAL HISTORY:  Hemorrhoid    No significant past surgical history    VITALS     T(F): 98 (10-28-22 @ 19:37), Max: 98.9 (10-28-22 @ 05:21)  HR: 68 (10-28-22 @ 19:37) (62 - 73)  BP: 184/98 (10-28-22 @ 19:37) (132/88 - 184/98)  RR: 18 (10-28-22 @ 19:37) (18 - 18)  SpO2: 98% (10-28-22 @ 13:03) (98% - 100%)    PHYSICAL EXAM:  GENERAL: NAD, well-developed  HEAD:  Atraumatic, Normocephalic  EYES: EOMI, PERRLA, conjunctiva and sclera clear  NECK: Supple, No JVD  CHEST/LUNG: Clear to auscultation bilaterally; No wheeze  HEART: Regular rate and rhythm; No murmurs, rubs, or gallops  ABDOMEN: Soft, Nontender, Nondistended; Bowel sounds present  EXTREMITIES:  2+ Peripheral Pulses, No clubbing, cyanosis, or edema  PSYCH: AAOx3  NEUROLOGY: non-focal  SKIN: No rashes or lesions      LABS:                        16.0   5.70  )-----------( 166      ( 28 Oct 2022 06:21 )             46.1       10-28    139  |  100  |  8<L>  ----------------------------<  129<H>  3.5   |  27  |  0.8    Ca    9.0      28 Oct 2022 06:21    TPro  7.2  /  Alb  4.4  /  TBili  0.6  /  DBili  x   /  AST  14  /  ALT  10  /  AlkPhos  60  10-28    TTE:   Summary:   1. LV Ejection Fraction by Orlando's Method with a biplane EF of 72 %.   2. Normal global left ventricular systolic function.   3. Spectral Doppler shows impaired relaxation pattern of left   ventricular myocardial filling (Grade I diastolic dysfunction).   4. Estimatedpulmonary artery systolic pressure is 43.8 mmHg assuming a   right atrial pressure of 15 mmHg, which is consistent with mild pulmonary   hypertension.   5. Dilatation of the aortic root.    CT CHEST W/ IV Dye     PULMONARY EMBOLUS: No pulmonary embolus.    LUNGS: Trachea and central airways are patent. No consolidation, effusion   or pneumothorax. No architectural distortion, honeycombing or bronchiectasis..    HEART/GREAT VESSELS: No pericardial effusion. Thoracic aorta and main pulmonary artery are normal in caliber..    MEDIASTINUM/THORACIC NODES: Mediastinal and hilar lymph nodes including a   subaortic 1.4 x 1.3 cm node (series 4 image 73), left lower paratracheal   1.4 x 1.0 cm node (series 4 image 78) and right hilar 1.6 x 1.7 cm lymph   node (series 4 image 94).    VISUALIZED UPPER ABDOMEN: Unremarkable.    BONES/SOFT TISSUES: Mild degenerative changes of the thoracic spine..    IMPRESSION:    No pulmonary embolus or CT evidence for acute intrathoracic pathology.    Nonspecific mediastinal lymphadenopathy, as above.    MICROBIOLOGY:      RADIOLOGY & ADDITIONAL TESTS: all reviewed     MEDICATIONS  (STANDING):  enoxaparin Injectable 40 milliGRAM(s) SubCutaneous every 24 hours  hydrochlorothiazide 25 milliGRAM(s) Oral daily  influenza   Vaccine 0.5 milliLiter(s) IntraMuscular once  NIFEdipine XL 30 milliGRAM(s) Oral daily  pantoprazole    Tablet 40 milliGRAM(s) Oral before breakfast    MEDICATIONS  (PRN):  acetaminophen     Tablet .. 650 milliGRAM(s) Oral every 6 hours PRN Temp greater or equal to 38C (100.4F)  melatonin 3 milliGRAM(s) Oral at bedtime PRN Insomnia

## 2022-10-28 NOTE — PROGRESS NOTE ADULT - ASSESSMENT
A 52 yrs/M with no significant pmhx presented to the ED with complains of fever, cough, generalized weakness and body aches for last few days. [place] came [place] from [place] on 10/25/2022. Pt was evaluated in the ED and EKG was done which showed RBBB and LHV with T wave inversion in marylu lateral leads. Pt did not complain of any chest pain.     #Fever with cough:  -No fever while in the hospital   -COVID-19 negative  -Malarial parasite blood smear: appears to be negative for intracellular and extracellular malarial parasites   -r/o viral etiology  -monitor off antibiotics  -Trend CBC  -Monitor vitals    #LVH and T-wave inversion on EKG:  -Code STEMI called in ED, which was later cancelled  -No chest pain at presentation and subsequent evaluation  -Serum Troponin T <0.01  -  -s/p CV eval   -Trops neg   -F/U lipid profile, HbA1c    #Accelerated HTN w/ TTE As above DIALATED AORTIC ROOT w/ Uncontrolled HTN   --> Start on Procardia 30mg po qd and HCTZ 25mg po qd  --> Monitor overnight BP and anticipate am d/c home   --> d/w Dr Chung will f/u in office with him and [facility] appointment       #?Hemorrhoids:  -patient thinks he has on and off symptoms of hemorrhoid  -Never been evaluated by a medical practicioner  -takes Daflon 1000mg if symptomatic    #MISC:  -DVT prophylaxis: Lovenox  -GI prophylaxis: Protonix  -Activity: IAT  -Diet: Low Na   -Code status: Full code    Dispo Home in am f/u BPs d/w his Brother at [phone]

## 2022-10-28 NOTE — CONSULT NOTE ADULT - ASSESSMENT
IMPRESSION:  Chronic non productive cough  Nonspecific mediastinal lymphadenopathy    PLAN:  CXR negative, Bronchiectasis, persistent PNA, sarcoidosis & TB essentially ruled out.  check RSV, influenza panel  Echocardiogram pending.  May start empirical treatment for Asthma/UACS/GERD: Albuterol PRN. Protonix qd  outpt PFTs  outpt CT surgery Follow up for mediastinal node biopsy      
53 Y/O M with no significant PMHx presented to the hospital for fever, cough, generalized weakness and body aches.    IMPRESSION  Fever with cough, r/o viral etiology    RECOMMENDATIONS  check serial EKGs, and [place]  check D-Dimers, lipid profile, HbA1c  get 2 D echo r/o HCM given LVH with TWI in anterio lateral leads

## 2022-10-28 NOTE — PATIENT PROFILE ADULT - FALL HARM RISK - HARM RISK INTERVENTIONS

## 2022-10-28 NOTE — CONSULT NOTE ADULT - SUBJECTIVE AND OBJECTIVE BOX
Patient is a 52y old  Male who presents with a chief complaint of Chest pain, fever, cough (27 Oct 2022 11:33)      HPI:  A 52 yrs/M with no significant pmhx presented to the ED with complains of fever, cough, generalized weakness and body aches for last few days. [place] came [place] from [place] on 10/25/2022. Pt was evaluated in the ED and EKG was done which showed RBBB and LHV with T wave inversion in marylu lateral leads. Pt did not complain of any chest pain. He speaks Kiswahili language, interpretor services were used for translation. Pt started getting fever with cough and rhinorrhea few days ago. However did not have any chest pain or associated SOB. Pt is also endorsing generalized weakness and body aches. Because of the EKG findings, cod STEMI was called and cardio was consulted. Later, code STEMI was cancelled.  Patient denies headache, abdominal pain, loss of consciousness, hemoptysis, SOB, dysuria or vomiting.    In the ED:  T(C): 36.4 (10-26-22 @ 23:32), Max: 36.5 (10-26-22 @ 17:09)  HR: 62 (10-26-22 @ 23:32) (62 - 67)  BP: 172/84 (10-26-22 @ 23:32) (156/88 - 172/84)  RR: 18 (10-26-22 @ 23:32) (18 - 18)  SpO2: 100% (10-26-22 @ 23:32) (99% - 100%)    CONSTITUTIONAL: Well groomed, no apparent distress  EYES: PERRLA and symmetric, EOMI, No conjunctival or scleral injection, non-icteric  ENMT: Oral mucosa with moist membranes. Normal dentition; no pharyngeal injection or exudates             NECK: Supple, symmetric and without tracheal deviation   RESP: No respiratory distress, no use of accessory muscles; CTA b/l, no WRR  CV: RRR, +S1S2, no MRG; no JVD; no peripheral edema  GI: Soft, NT, ND, no rebound, no guarding; no palpable masses; no hepatosplenomegaly; no hernia palpated  LYMPH: No cervical LAD or tenderness; no axillary LAD or tenderness; no inguinal LAD or tenderness  MSK: Normal gait; No digital clubbing or cyanosis; examination of the (head/neck/spine/ribs/pelvis, RUE, LUE, RLE, LLE) without misalignment,            Normal ROM without pain, no spinal tenderness, normal muscle strength/tone  SKIN: No rashes or ulcers noted; no subcutaneous nodules or induration palpable  NEURO: CN II-XII intact; normal reflexes in upper and lower extremities, sensation intact in upper and lower extremities b/l to light touch   PSYCH: Appropriate insight/judgment; A+O x 3, mood and affect appropriate, recent/remote memory intact   (27 Oct 2022 01:02)    Pulmonary history:  Patient recently moved from [place] 2 days ago. he speaks Kisw[place]ili. translation provided by Brother over the phone per patient request. per patient he has been having chronic dry cough "for years". no specific triggers. nothing makes it better or worse. last year around august he received flu shot which made the cough better for couple of months. however now the cough is getting worse again. this time patient presents with fever and cough, found to have RBBB with LVH and TWI anterio lateral leads, currently being worked up by cardiology. CT angio PE study also showed Mediastinal and hilar lymph nodes including a subaortic 1.4 x 1.3 cm node (series 4 image 73), left lower paratracheal 1.4 x 1.0 cm node (series 4 image 78) and right hilar 1.6 x 1.7 cm lymph node (series 4 image 94). Fmhx of HOCM s/p pacemaker in brother. . Echo pending. patient's malaria smear was negative. Never smoker      PAST MEDICAL & SURGICAL HISTORY:  Hemorrhoid      No significant past surgical history          SOCIAL HX:   Never smoker   FAMILY HISTORY:  No pertinent family history in first degree relatives    .  No cardiovascular or pulmonary family history     REVIEW OF SYSTEMS:    All ROS are negative except per HPI     Allergies    fish (Flatulence)  No Known Drug Allergies  peanuts (Flatulence)    Intolerances          PHYSICAL EXAM  Vital Signs Last 24 Hrs  T(C): 37.2 (28 Oct 2022 05:21), Max: 37.2 (28 Oct 2022 05:21)  T(F): 98.9 (28 Oct 2022 05:21), Max: 98.9 (28 Oct 2022 05:21)  HR: 62 (28 Oct 2022 05:21) (62 - 72)  BP: 179/96 (28 Oct 2022 05:21) (132/88 - 192/111)  BP(mean): --  RR: 18 (27 Oct 2022 23:49) (18 - 18)  SpO2: 100% (27 Oct 2022 23:49) (100% - 100%)    Parameters below as of 27 Oct 2022 23:49  Patient On (Oxygen Delivery Method): room air        CONSTITUTIONAL:  Well nourished.  NAD    ENT:   Airway patent,   No thrush    EYES:   Clear bilaterally,   pupils equal,   round and reactive to light.    CARDIAC:   Normal rate,   regular rhythm.    no edema      CAROTID:   normal systolic impulse  no bruits    RESPIRATORY:   No wheezing  Nnormal chest expansion  Not tachypneic,  No use of accessory muscles  +dry cough    GASTROINTESTINAL:  Abdomen soft,   non-tender,   no guarding,   + BS    GENITOURINARY  normal genitalia for sex  no edema    MUSCULOSKELETAL:   range of motion is not limited,  no clubbing, cyanosis    NEUROLOGICAL:   Alert and oriented   no motor deficits.    SKIN:   Skin normal color for race,   No evidence of rash.    PSYCHIATRIC:   normal mood and affect.   no apparent risk to self or others.    HEME LYMPH:   No cervical  lymphadenopathy.  no inguinal lymphadenopathy          LABS:                          16.0   5.70  )-----------( 166      ( 28 Oct 2022 06:21 )             46.1                                               10-28    139  |  100  |  8<L>  ----------------------------<  129<H>  3.5   |  27  |  0.8    Ca    9.0      28 Oct 2022 06:21  Mg     2.1     10-26    TPro  7.2  /  Alb  4.4  /  TBili  0.6  /  DBili  x   /  AST  14  /  ALT  10  /  AlkPhos  60  10-28      PT/INR - ( 26 Oct 2022 17:41 )   PT: 11.40 sec;   INR: 1.00 ratio         PTT - ( 26 Oct 2022 17:41 )  PTT:35.0 sec                                           CARDIAC MARKERS ( 26 Oct 2022 17:41 )  x     / <0.01 ng/mL / x     / x     / x                                                LIVER FUNCTIONS - ( 28 Oct 2022 06:21 )  Alb: 4.4 g/dL / Pro: 7.2 g/dL / ALK PHOS: 60 U/L / ALT: 10 U/L / AST: 14 U/L / GGT: x                                                                                                MEDICATIONS  (STANDING):  amLODIPine   Tablet 10 milliGRAM(s) Oral daily  enoxaparin Injectable 40 milliGRAM(s) SubCutaneous every 24 hours  influenza   Vaccine 0.5 milliLiter(s) IntraMuscular once  pantoprazole    Tablet 40 milliGRAM(s) Oral before breakfast    MEDICATIONS  (PRN):  acetaminophen     Tablet .. 650 milliGRAM(s) Oral every 6 hours PRN Temp greater or equal to 38C (100.4F)  melatonin 3 milliGRAM(s) Oral at bedtime PRN Insomnia      X-Rays reviewed: < from: Xray Chest 1 View- PORTABLE-Urgent (Xray Chest 1 View- PORTABLE-Urgent .) (10.26.22 @ 18:11) >  Impression:    No radiographic evidence of acute cardiopulmonary disease.    < end of copied text >    < from: CT Angio Chest PE Protocol w/ IV Cont (10.27.22 @ 14:07) >  IMPRESSION:    No pulmonary embolus or CT evidence for acute intrathoracic pathology.    Nonspecific mediastinal lymphadenopathy, as above.    --- End of Report ---    < end of copied text >

## 2022-10-29 ENCOUNTER — TRANSCRIPTION ENCOUNTER (OUTPATIENT)
Age: 52
End: 2022-10-29

## 2022-10-29 VITALS — DIASTOLIC BLOOD PRESSURE: 82 MMHG | HEART RATE: 85 BPM | SYSTOLIC BLOOD PRESSURE: 141 MMHG

## 2022-10-29 LAB
RAPID RVP RESULT: SIGNIFICANT CHANGE UP
SARS-COV-2 RNA SPEC QL NAA+PROBE: SIGNIFICANT CHANGE UP
TROPONIN T SERPL-MCNC: <0.01 NG/ML — SIGNIFICANT CHANGE UP

## 2022-10-29 PROCEDURE — 99239 HOSP IP/OBS DSCHRG MGMT >30: CPT

## 2022-10-29 RX ORDER — NIFEDIPINE 30 MG
1 TABLET, EXTENDED RELEASE 24 HR ORAL
Qty: 30 | Refills: 0
Start: 2022-10-29 | End: 2022-11-27

## 2022-10-29 RX ADMIN — Medication 30 MILLIGRAM(S): at 05:47

## 2022-10-29 RX ADMIN — Medication 25 MILLIGRAM(S): at 05:47

## 2022-10-29 RX ADMIN — PANTOPRAZOLE SODIUM 40 MILLIGRAM(S): 20 TABLET, DELAYED RELEASE ORAL at 06:14

## 2022-10-29 NOTE — DISCHARGE NOTE NURSING/CASE MANAGEMENT/SOCIAL WORK - NSDCPEFALRISK_GEN_ALL_CORE
For information on Fall & Injury Prevention, visit: [email or web address] OR  [email or web address] OR  https://www.cdc.gov/steadi/patient.html

## 2022-10-29 NOTE — DISCHARGE NOTE NURSING/CASE MANAGEMENT/SOCIAL WORK - PATIENT PORTAL LINK FT
You can access the FollowMyHealth Patient Portal offered by [facility] by registering at the following website: [email or web address]. By joining [employer]’s FollowMyHealth portal, you will also be able to view your health information using other applications (apps) compatible with our system.

## 2022-10-30 LAB — PROCALCITONIN SERPL-MCNC: <0.02 NG/ML — SIGNIFICANT CHANGE UP (ref 0.02–0.1)

## 2022-10-31 PROBLEM — K64.9 UNSPECIFIED HEMORRHOIDS: Chronic | Status: ACTIVE | Noted: 2022-10-27

## 2022-11-02 LAB
GAMMA INTERFERON BACKGROUND BLD IA-ACNC: 0.55 IU/ML — SIGNIFICANT CHANGE UP
M TB IFN-G BLD-IMP: POSITIVE
M TB IFN-G CD4+ BCKGRND COR BLD-ACNC: 5.24 IU/ML — SIGNIFICANT CHANGE UP
M TB IFN-G CD4+CD8+ BCKGRND COR BLD-ACNC: 5.85 IU/ML — SIGNIFICANT CHANGE UP
QUANT TB PLUS MITOGEN MINUS NIL: 9.42 IU/ML — SIGNIFICANT CHANGE UP

## 2022-11-04 DIAGNOSIS — Z20.822 CONTACT WITH AND (SUSPECTED) EXPOSURE TO COVID-19: ICD-10-CM

## 2022-11-04 DIAGNOSIS — R50.9 FEVER, UNSPECIFIED: ICD-10-CM

## 2022-11-04 DIAGNOSIS — R05.3 CHRONIC COUGH: ICD-10-CM

## 2022-11-04 DIAGNOSIS — I45.10 UNSPECIFIED RIGHT BUNDLE-BRANCH BLOCK: ICD-10-CM

## 2022-11-04 DIAGNOSIS — R53.1 WEAKNESS: ICD-10-CM

## 2022-11-04 DIAGNOSIS — Z91.013 ALLERGY TO SEAFOOD: ICD-10-CM

## 2022-11-04 DIAGNOSIS — Z91.010 ALLERGY TO PEANUTS: ICD-10-CM

## 2022-11-04 DIAGNOSIS — I10 ESSENTIAL (PRIMARY) HYPERTENSION: ICD-10-CM

## 2022-11-04 DIAGNOSIS — K64.9 UNSPECIFIED HEMORRHOIDS: ICD-10-CM

## 2022-11-07 ENCOUNTER — NON-APPOINTMENT (OUTPATIENT)
Age: 52
End: 2022-11-07

## 2022-11-07 ENCOUNTER — APPOINTMENT (OUTPATIENT)
Dept: INTERNAL MEDICINE | Facility: CLINIC | Age: 52
End: 2022-11-07

## 2022-11-07 ENCOUNTER — OUTPATIENT (OUTPATIENT)
Dept: OUTPATIENT SERVICES | Facility: HOSPITAL | Age: 52
LOS: 1 days | Discharge: HOME | End: 2022-11-07

## 2022-11-07 VITALS
OXYGEN SATURATION: 99 % | BODY MASS INDEX: 23.36 KG/M2 | HEIGHT: 74 IN | WEIGHT: 182 LBS | TEMPERATURE: 97.7 F | HEART RATE: 79 BPM | SYSTOLIC BLOOD PRESSURE: 137 MMHG | DIASTOLIC BLOOD PRESSURE: 85 MMHG

## 2022-11-07 DIAGNOSIS — R53.83 OTHER FATIGUE: ICD-10-CM

## 2022-11-07 DIAGNOSIS — R59.1 GENERALIZED ENLARGED LYMPH NODES: ICD-10-CM

## 2022-11-07 DIAGNOSIS — Z00.00 ENCOUNTER FOR GENERAL ADULT MEDICAL EXAMINATION W/OUT ABNORMAL FINDINGS: ICD-10-CM

## 2022-11-07 DIAGNOSIS — R05.9 COUGH, UNSPECIFIED: ICD-10-CM

## 2022-11-07 PROCEDURE — 99204 OFFICE O/P NEW MOD 45 MIN: CPT | Mod: GC

## 2022-11-07 RX ORDER — HYDROCHLOROTHIAZIDE 25 MG/1
25 TABLET ORAL DAILY
Qty: 30 | Refills: 5 | Status: ACTIVE | COMMUNITY
Start: 2022-11-07 | End: 1900-01-01

## 2022-11-07 RX ORDER — SIMETHICONE 125 MG
125 CAPSULE ORAL 3 TIMES DAILY
Qty: 90 | Refills: 1 | Status: ACTIVE | COMMUNITY
Start: 2022-11-07 | End: 1900-01-01

## 2022-11-07 RX ORDER — NIFEDIPINE 30 MG/1
30 TABLET, EXTENDED RELEASE ORAL DAILY
Qty: 30 | Refills: 5 | Status: ACTIVE | COMMUNITY
Start: 2022-11-07 | End: 1900-01-01

## 2022-11-07 NOTE — HISTORY OF PRESENT ILLNESS
[FreeTextEntry2] : Patient is  51 yo male WIth no medical history presented to the clinic post hospital discharge follow up.  [place] from [place] 10/25/2022.Patient was hospitalized on 10/26-10/29 for fever, cough, generalized weakness. In ED, EKG showed RBBB and LHV with T wave inversion in anterior lateral leads, for which STEMI code was called, but subsequently cancelled. pt was seeen by Cardio, had negative troponinx3, ECHO showed EF 72, GIDD, mild pulmonary hypertension. CT chest was done in hospital , which showed mediastinal and hilar lymph nodes including a \par subaortic 1.4 x 1.3 cm node, left lower paratracheal 1.4 x 1.0 cm node and right hilar 1.6 x 1.7 cm lymph \par node.Pt was started on nifedipine and HCTZ in hospital for HTN.\par Pt tam is accompanied by brother Moni, who translates from  Japanese language. Pt reports hostory of the dry cough for 17 years, which currently radiated to his upper back. Patient was [place]iting [place] last year, was given shot in the back??, which reports improved his cough for about 11 months, but now reoccurred. He is planning on seeing pulmonary doctor. He also reports increased abdominal shereen mostly after eating, which occurred for about 10 years. he also reports one year history of feeling weak and increased sweats. \par pt admits hemorrhoids, which has not bleed since 1997. He had Colonoscopy in 2008 was borjas in [place], he was not told of specific details. \par Non smoker, no alcohol use\par \par \par \par \par

## 2022-11-07 NOTE — PHYSICAL EXAM
[No Acute Distress] : no acute distress [Well Nourished] : well nourished [Well Developed] : well developed [Normal Sclera/Conjunctiva] : normal sclera/conjunctiva [EOMI] : extraocular movements intact [Normal Outer Ear/Nose] : the outer ears and nose were normal in appearance [Normal Oropharynx] : the oropharynx was normal [No Lymphadenopathy] : no lymphadenopathy [Supple] : supple [Thyroid Normal, No Nodules] : the thyroid was normal and there were no nodules present [No Respiratory Distress] : no respiratory distress  [No Accessory Muscle Use] : no accessory muscle use [Clear to Auscultation] : lungs were clear to auscultation bilaterally [Normal Rate] : normal rate  [Regular Rhythm] : with a regular rhythm [Normal S1, S2] : normal S1 and S2 [Pedal Pulses Present] : the pedal pulses are present [No Edema] : there was no peripheral edema [No Extremity Clubbing/Cyanosis] : no extremity clubbing/cyanosis [Soft] : abdomen soft [Non Tender] : non-tender [Non-distended] : non-distended [No Rash] : no rash [Coordination Grossly Intact] : coordination grossly intact [No Focal Deficits] : no focal deficits [Normal Affect] : the affect was normal [Normal Insight/Judgement] : insight and judgment were intact

## 2022-11-07 NOTE — ASSESSMENT
[FreeTextEntry1] : Patient is  53 yo male WIth no medical history presented to the clinic post hospital discharge follow up.  Pt came to [place] from [place] on 10/25/2022.Patient was hospitalized on 10/26-10/29 for fever, cough, generalized weakness. In ED, EKG showed RBBB and LHV with T wave inversion in anterior lateral leads, for which STEMI code was called, but subsequently cancelled. pt was seeen by Cardio, had negative troponinx3, ECHO showed EF 72, GIDD, mild pulmonary hypertension. CT chest was done in hospital , which showed mediastinal and hilar lymph nodes including a subaortic 1.4 x 1.3 cm node, left lower paratracheal 1.4 x 1.0 cm node and right hilar 1.6 x 1.7 cm lymph node.Pt was started on nifedipine and HCTZ in hospital for HTN.\par Pt tam is accompanied by brother Moni, who translates from  Irish language. Pt reports hostory of the dry cough for 17 years, which currently radiated to his upper back. [place] last [place], was given shot in the back??, which reports improved his cough for about 11 months, but now reoccurred. He is planning on seeing pulmonary doctor. He also reports increased abdominal shereen mostly after eating, which occurred for about 10 years. he also reports one year history of feeling weak and increased sweats. \par pt admits hemorrhoids, which has not bleed since 1997. He had Colonoscopy in 2008 was borjas in [place], he was not told of specific details. \par Non smoker, no alcohol use\par \par #chronic cough\par - pt has appointment at pulmonologist tomorrow \par \par # mediastinal lymph nodes\par - CT surgery appointment for biopsy\par 11/15/22 10.30am DR TORRES [address] [facility], 3rd floor.\par \par #HTN\par - c/w nifedipine and HCTZ\par \par # gas postprandial\par #hemorrhoids\par - denies bleeding\par - last colonoscope 2008\par - f/u GI for colonoscpy\par - trial of simeticone\par \par # HCM\par - s/p flu shot current season\par - covid fully vaccinated\par - f/u in 2 months\par \par \par \par \par \par  \par \par \par \par \par \par

## 2022-11-08 ENCOUNTER — INPATIENT (INPATIENT)
Facility: HOSPITAL | Age: 52
LOS: 1 days | Discharge: HOME | End: 2022-11-10
Admitting: INTERNAL MEDICINE
Payer: MEDICAID

## 2022-11-08 ENCOUNTER — NON-APPOINTMENT (OUTPATIENT)
Age: 52
End: 2022-11-08

## 2022-11-08 ENCOUNTER — APPOINTMENT (OUTPATIENT)
Dept: PULMONOLOGY | Facility: CLINIC | Age: 52
End: 2022-11-08
Payer: MEDICAID

## 2022-11-08 ENCOUNTER — OUTPATIENT (OUTPATIENT)
Dept: OUTPATIENT SERVICES | Facility: HOSPITAL | Age: 52
LOS: 1 days | Discharge: HOME | End: 2022-11-08

## 2022-11-08 VITALS
HEART RATE: 77 BPM | TEMPERATURE: 96.5 F | HEIGHT: 74 IN | OXYGEN SATURATION: 98 % | DIASTOLIC BLOOD PRESSURE: 81 MMHG | SYSTOLIC BLOOD PRESSURE: 137 MMHG | BODY MASS INDEX: 23.36 KG/M2 | WEIGHT: 182 LBS

## 2022-11-08 VITALS
HEART RATE: 88 BPM | SYSTOLIC BLOOD PRESSURE: 129 MMHG | DIASTOLIC BLOOD PRESSURE: 76 MMHG | RESPIRATION RATE: 18 BRPM | OXYGEN SATURATION: 99 % | TEMPERATURE: 97 F

## 2022-11-08 DIAGNOSIS — R50.9 FEVER, UNSPECIFIED: ICD-10-CM

## 2022-11-08 DIAGNOSIS — R76.12 NONSPECIFIC REACTION TO CELL MEDIATED IMMUNITY MEASUREMENT OF GAMMA INTERFERON ANTIGEN RESPONSE W/OUT ACTIVE TUBERCULOSIS: ICD-10-CM

## 2022-11-08 DIAGNOSIS — R61 GENERALIZED HYPERHIDROSIS: ICD-10-CM

## 2022-11-08 DIAGNOSIS — R59.0 LOCALIZED ENLARGED LYMPH NODES: ICD-10-CM

## 2022-11-08 LAB
ALBUMIN SERPL ELPH-MCNC: 4.6 G/DL — SIGNIFICANT CHANGE UP (ref 3.5–5.2)
ANION GAP SERPL CALC-SCNC: 13 MMOL/L — SIGNIFICANT CHANGE UP (ref 7–14)
AST SERPL-CCNC: 19 U/L — SIGNIFICANT CHANGE UP (ref 0–41)
BASOPHILS # BLD AUTO: 0.04 K/UL — SIGNIFICANT CHANGE UP (ref 0–0.2)
BILIRUB SERPL-MCNC: 0.3 MG/DL — SIGNIFICANT CHANGE UP (ref 0.2–1.2)
BUN SERPL-MCNC: 20 MG/DL — SIGNIFICANT CHANGE UP (ref 10–20)
CO2 SERPL-SCNC: 29 MMOL/L — SIGNIFICANT CHANGE UP (ref 17–32)
CREAT SERPL-MCNC: 0.9 MG/DL — SIGNIFICANT CHANGE UP (ref 0.7–1.5)
EGFR: 101 ML/MIN/1.73M2 — SIGNIFICANT CHANGE UP
EOSINOPHIL # BLD AUTO: 0.18 K/UL — SIGNIFICANT CHANGE UP (ref 0–0.7)
EOSINOPHIL NFR BLD AUTO: 2.1 % — SIGNIFICANT CHANGE UP (ref 0–8)
GLUCOSE SERPL-MCNC: 90 MG/DL — SIGNIFICANT CHANGE UP (ref 70–99)
HCT VFR BLD CALC: 47.2 % — SIGNIFICANT CHANGE UP (ref 42–52)
MCHC RBC-ENTMCNC: 30.5 PG — SIGNIFICANT CHANGE UP (ref 27–31)
MCHC RBC-ENTMCNC: 35.2 G/DL — SIGNIFICANT CHANGE UP (ref 32–37)
MCV RBC AUTO: 86.6 FL — SIGNIFICANT CHANGE UP (ref 80–94)
NEUTROPHILS # BLD AUTO: 6.82 K/UL — HIGH (ref 1.4–6.5)
NEUTROPHILS NFR BLD AUTO: 77.9 % — HIGH (ref 42.2–75.2)
NRBC # BLD: 0 /100 WBCS — SIGNIFICANT CHANGE UP (ref 0–0)
PLATELET # BLD AUTO: 193 K/UL — SIGNIFICANT CHANGE UP (ref 130–400)
POTASSIUM SERPL-MCNC: 4.4 MMOL/L — SIGNIFICANT CHANGE UP (ref 3.5–5)
POTASSIUM SERPL-SCNC: 4.4 MMOL/L — SIGNIFICANT CHANGE UP (ref 3.5–5)
RBC # BLD: 5.45 M/UL — SIGNIFICANT CHANGE UP (ref 4.7–6.1)
SARS-COV-2 RNA SPEC QL NAA+PROBE: SIGNIFICANT CHANGE UP

## 2022-11-08 PROCEDURE — 99285 EMERGENCY DEPT VISIT HI MDM: CPT

## 2022-11-08 PROCEDURE — 71045 X-RAY EXAM CHEST 1 VIEW: CPT | Mod: 26

## 2022-11-08 PROCEDURE — 93010 ELECTROCARDIOGRAM REPORT: CPT

## 2022-11-08 PROCEDURE — 99205 OFFICE O/P NEW HI 60 MIN: CPT | Mod: GC

## 2022-11-08 RX ORDER — MAGNESIUM SULFATE 500 MG/ML
2 VIAL (ML) INJECTION ONCE
Refills: 0 | Status: COMPLETED | OUTPATIENT
Start: 2022-11-08 | End: 2022-11-08

## 2022-11-08 RX ADMIN — Medication 25 GRAM(S): at 14:04

## 2022-11-08 NOTE — ED ADULT NURSE NOTE - CHIEF COMPLAINT QUOTE
pt pw possible tb, was sent in by [facility] physician partners for r/o tb or allergies. pt co cough for 16-20 yrs and gen weakness. denies other symptoms.

## 2022-11-08 NOTE — ED PROVIDER NOTE - OBJECTIVE STATEMENT
Irish speaking   ID: [id number]  53 yo M with PMH of HTN presenting after being found to be TB positive via quantiferon gold. Patient states he has had a non-productive cough for the past 17 years, but denies any change in cough or sputum production. Endorses that whole family at home has similar intermittent cough, but no new recent sick contacts. Recently traveled from [facility] 10/25 and presented 10/26 for generalized weakness and body aches. Patient was admitted due to RBBB on EKG, but discharged with cardio follow up. Patient denies any other symptoms today including headache, vision changes, fever, CP, SOB, NVD, weight loss, fatigue, night sweats. Does not know if he received TB vaccination as a child.

## 2022-11-08 NOTE — ASSESSMENT
[FreeTextEntry1] : The patient is a 51 yo male from [place] (10/25/2022)l with no PMHx, non smoker, no history of TB immunization, with a recent hospitalization from Oct 26-29 for fever cough and generalized weakness who endorses a persistent dry cough for the last 16-20 years accompanied by occasional night sweats and generalized tiredness and weakness. During his hospitalization from Oct 26-29 Quantiferon Gold came back positive, and a CT chest showed mediastinal and hilar lymph nodes including a subaortic 1.4 x 1.3 cm node, left lower paratracheal 1.4 x 1.0 cm node and right hilar 1.6 x 1.7 lymph node. 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

## 2022-11-08 NOTE — ED ADULT TRIAGE NOTE - CHIEF COMPLAINT QUOTE
pt pw possible tb, was sent in by [facility] physician partners for r/o tb or allergies. pt co cough for 16-20 yrs and gen weakness. denies other symptoms.

## 2022-11-08 NOTE — ED PROVIDER NOTE - CARE PLAN
1 Principal Discharge DX:	Cough   Principal Discharge DX:	Cough  Secondary Diagnosis:	Prolonged QT interval

## 2022-11-08 NOTE — ED PROVIDER NOTE - COVID-19  TEST TYPE
Bedside report given to Magda Snellen, RN that included SBAR, recent results, and cardiac rhythm. No acute events overnight. MOLECULAR PCR

## 2022-11-08 NOTE — ED PROVIDER NOTE - NS ED ROS FT
Constitutional: (-) fever, (-) chills, (-) lethargy  Eyes: (-) eye pain, (-) visual changes, (-) discharge  ENMT: (-) nasal congestion, (-) sore throat. (-) neck pain (-) neck stiffness  Respiratory: (-) cough, (-) SOB, (-) BABIN, (-) respiratory distress  Cardiac: (-) chest pain, (-) palpitations  GI: (-) abdominal pain, (-) nausea, (-) vomiting, (-) diarrhea.  :  (-) dysuria, (-) hematuria, (-) frequency   MS:  (-) back pain, (-) joint pain.  Neuro:  (-) headache, (-) numbness, (-) focal weakness, (-) tingling   Skin:  (-) rash  Except as documented in the HPI,  all other systems are negative

## 2022-11-08 NOTE — END OF VISIT
[] : Resident [FreeTextEntry3] : Mr Guzman was seen & examined today with his brother providing translation services.  He has tested positive for TB, with mediastinal lymphadenopathy, and the history of night sweats and fevers even in the absence of clear pulmonary parenchymal disease is concerning for possible active TB.  Will send to the ED for sputum sampling, culture, and PCR testing to accelerate patient's possible travel back to [place].  Recommend ID consult on admission.

## 2022-11-08 NOTE — PHYSICAL EXAM
[Well Groomed] : well groomed [Normal Appearance] : normal appearance [Supple] : supple [Normal Rate/Rhythm] : normal rate/rhythm [Normal S1, S2] : normal s1, s2 [No Abnormalities] : no abnormalities [Benign] : benign [Not Tender] : not tender [No Masses] : no masses [Normal Gait] : normal gait [No Clubbing] : no clubbing [No Cyanosis] : no cyanosis [No Edema] : no edema [No Focal Deficits] : no focal deficits [Oriented x3] : oriented x3 [Normal Affect] : normal affect [No Acute Distress] : no acute distress

## 2022-11-08 NOTE — ED PROVIDER NOTE - CLINICAL SUMMARY MEDICAL DECISION MAKING FREE TEXT BOX
52-year-old male past medical history as documented sent to the ED by pulmonary to rule out TB.  Patient with chronic cough for 17 years.  Patient with positive QuantiFERON recently.  Patient admitted to medicine.

## 2022-11-08 NOTE — ED ADULT NURSE NOTE - OBJECTIVE STATEMENT
pt pw possible tb, was sent in by [facility] physician partners for r/o tb or allergies. pt co cough for 16-20 yrs and gen weakness. denies other symptoms.

## 2022-11-08 NOTE — HISTORY OF PRESENT ILLNESS
[TextBox_4] : The patient is a 51 yo male from  910/25/2022)l with no PMHx, non smoker, no history of TB immunization, with a recent hospitalization from Oct 26-29 for fever cough and generalized weakness who endorses a persistent dry cough. The patient has had a mild dry cough for the last 16-20 years, which worsens with air freshner and carpet dust. He notes that he occasionally has night sweats, but mostly during the summer and that his entire family has them. He states that he has been feeling weak and tired more easily, and has had increased shortness of breath where he now feels short of breath after climbing 2-3 floors instead of 6 floors. Notably, during the hospitalization Quantiferon Gold came back positive, and a CT chest showed mediastinal and hilar lymph nodes including a subaortic 1.4 x 1.3 cm node, left lower paratracheal 1.4 x 1.0 cm node and right hilar 1.6 x 1.7 lymph node. \par \par \par The patient denies a seasonal component to his cough and states that the cough is not accompanied by post nasal drip or neck soreness, not accompanied by acid reflux and not accompanied by epigastric pain. He denies any systemic symptoms other than those mentioned in the HPI.\par

## 2022-11-08 NOTE — ED ADULT NURSE NOTE - NSSEPSISSUSPECTED_ED_A_ED
Genaro Leslie from [employer] called and states that Cecily Mclaughlin' Bone density pill is scheduled to be given on the  2nd Saturday of every month. However, his Insurance will only cover it in time for 3rd Saturday of every month. She is wondering if it would be okay to switch it to the 3rd weekend of every month? She is also inquiring as to whether or not there would be any benefit to switching to a once weekly density pill on Saturday mornings. Please call to advise. No

## 2022-11-08 NOTE — REVIEW OF SYSTEMS
[Fatigue] : fatigue [Cough] : cough [Fever] : no fever [Recent Wt Gain (___ Lbs)] : ~T no recent weight gain [Recent Wt Loss (___ Lbs)] : ~T no recent weight loss [Postnasal Drip] : no postnasal drip [Sputum] : no sputum [Abdominal Pain] : no abdominal pain

## 2022-11-08 NOTE — ED PROVIDER NOTE - ATTENDING CONTRIBUTION TO CARE
I personally evaluated the patient. I reviewed the Resident’s or Physician Assistant’s note (as assigned above), and agree with the findings and plan except as documented in my note.   52 I personally evaluated the patient. I reviewed the Resident’s or Physician Assistant’s note (as assigned above), and agree with the findings and plan except as documented in my note.   52 year-old male past medical history significant hypertension, non-smoker sent to the ED by Dr. Berrios of pulmonary, to rule out TB.  Patient with recent positive Quantiferon.  Patient reports cough for 17 years which has not improved or worsened recently.  No hemoptysis.  No night sweats, weight loss, fever, chills.  No exposure to TB.  Patient immigrated from [place] last month.  Patient with hospital admission last month for abnormal EKG.  No chest pain.  No shortness of breath.  No abdominal pain.  Patient denies any other complaints.  Vitals noted.  CONSTITUTIONAL: Well-appearing; well-nourished; in no apparent distress.   HEAD: Normocephalic; atraumatic.   EYES: PERRL; EOM intact. Conjunctiva normal B/L.   ENT: Normal pharynx with no tonsillar hypertrophy. MMM.  NECK: Supple; non-tender; no cervical lymphadenopathy.   CHEST: Normal chest excursion with respiration.   CARDIOVASCULAR: Normal S1, S2; no murmurs, rubs, or gallops.   RESPIRATORY: Normal chest excursion with respiration; breath sounds clear and equal bilaterally; no wheezes, rhonchi, or rales.  GI/: Normal bowel sounds; non-distended; non-tender.  BACK: No evidence of trauma or deformity. Non-tender to palpation. No CVA tenderness.   EXT: Normal ROM in all four extremities; non-tender to palpation; distal pulses are normal. No leg edema B/L.   SKIN: Normal for age and race; warm; dry; good turgor.  NEURO: A & O x 4; CN 2-12 intact. Grossly unremarkable. I personally evaluated the patient. I reviewed the Resident’s or Physician Assistant’s note (as assigned above), and agree with the findings and plan except as documented in my note.   52 year-old male past medical history significant hypertension, non-smoker sent to the ED by Dr. Berrios of pulmonary, to rule out TB.  Patient with recent positive Quantiferon.  Patient reports cough for 17 years which has not improved or worsened recently.  No hemoptysis.  No night sweats, weight loss, fever, chills.  No exposure to TB.  Patient immigrated from [place] last month.  Patient with hospital admission last month for abnormal EKG.  CTA  chest last month with mediastinal LAD. No chest pain.  No shortness of breath.  No abdominal pain.  Patient denies any other complaints.  Vitals noted.  CONSTITUTIONAL: Well-appearing; well-nourished; in no apparent distress.   HEAD: Normocephalic; atraumatic.   EYES: PERRL; EOM intact. Conjunctiva normal B/L.   ENT: Normal pharynx with no tonsillar hypertrophy. MMM.  NECK: Supple; non-tender; no cervical lymphadenopathy.   CHEST: Normal chest excursion with respiration.   CARDIOVASCULAR: Normal S1, S2; no murmurs, rubs, or gallops.   RESPIRATORY: Normal chest excursion with respiration; breath sounds clear and equal bilaterally; no wheezes, rhonchi, or rales.  GI/: Normal bowel sounds; non-distended; non-tender.  BACK: No evidence of trauma or deformity. Non-tender to palpation. No CVA tenderness.   EXT: Normal ROM in all four extremities; non-tender to palpation; distal pulses are normal. No leg edema B/L.   SKIN: Normal for age and race; warm; dry; good turgor.  NEURO: A & O x 4; CN 2-12 intact. Grossly unremarkable.

## 2022-11-09 DIAGNOSIS — Z02.9 ENCOUNTER FOR ADMINISTRATIVE EXAMINATIONS, UNSPECIFIED: ICD-10-CM

## 2022-11-09 LAB
ALBUMIN SERPL ELPH-MCNC: 4.8 G/DL — SIGNIFICANT CHANGE UP (ref 3.5–5.2)
ALP SERPL-CCNC: 67 U/L — SIGNIFICANT CHANGE UP (ref 30–115)
ALT FLD-CCNC: 15 U/L — SIGNIFICANT CHANGE UP (ref 0–41)
ANION GAP SERPL CALC-SCNC: 13 MMOL/L — SIGNIFICANT CHANGE UP (ref 7–14)
BASOPHILS # BLD AUTO: 0.05 K/UL — SIGNIFICANT CHANGE UP (ref 0–0.2)
BUN SERPL-MCNC: 12 MG/DL — SIGNIFICANT CHANGE UP (ref 10–20)
CALCIUM SERPL-MCNC: 9.9 MG/DL — SIGNIFICANT CHANGE UP (ref 8.4–10.5)
CHOLEST SERPL-MCNC: 227 MG/DL — HIGH
CREAT SERPL-MCNC: 0.9 MG/DL — SIGNIFICANT CHANGE UP (ref 0.7–1.5)
EGFR: 101 ML/MIN/1.73M2 — SIGNIFICANT CHANGE UP
EOSINOPHIL # BLD AUTO: 0.33 K/UL — SIGNIFICANT CHANGE UP (ref 0–0.7)
EOSINOPHIL NFR BLD AUTO: 3.5 % — SIGNIFICANT CHANGE UP (ref 0–8)
ESTIMATED AVERAGE GLUCOSE: 111 MG/DL — SIGNIFICANT CHANGE UP (ref 68–114)
GLUCOSE SERPL-MCNC: 92 MG/DL — SIGNIFICANT CHANGE UP (ref 70–99)
HDLC SERPL-MCNC: 65 MG/DL — SIGNIFICANT CHANGE UP
HGB BLD-MCNC: 17.4 G/DL — SIGNIFICANT CHANGE UP (ref 14–18)
LYMPHOCYTES # BLD AUTO: 2.5 K/UL — SIGNIFICANT CHANGE UP (ref 1.2–3.4)
LYMPHOCYTES # BLD AUTO: 26.4 % — SIGNIFICANT CHANGE UP (ref 20.5–51.1)
MCHC RBC-ENTMCNC: 30.4 PG — SIGNIFICANT CHANGE UP (ref 27–31)
MCV RBC AUTO: 84.4 FL — SIGNIFICANT CHANGE UP (ref 80–94)
MONOCYTES # BLD AUTO: 0.59 K/UL — SIGNIFICANT CHANGE UP (ref 0.1–0.6)
MONOCYTES NFR BLD AUTO: 6.2 % — SIGNIFICANT CHANGE UP (ref 1.7–9.3)
NEUTROPHILS # BLD AUTO: 5.97 K/UL — SIGNIFICANT CHANGE UP (ref 1.4–6.5)
NON HDL CHOLESTEROL: 162 MG/DL — HIGH
NRBC # BLD: 0 /100 WBCS — SIGNIFICANT CHANGE UP (ref 0–0)
PLATELET # BLD AUTO: 210 K/UL — SIGNIFICANT CHANGE UP (ref 130–400)
POTASSIUM SERPL-MCNC: 4.1 MMOL/L — SIGNIFICANT CHANGE UP (ref 3.5–5)
POTASSIUM SERPL-SCNC: 4.1 MMOL/L — SIGNIFICANT CHANGE UP (ref 3.5–5)
PROT SERPL-MCNC: 7.8 G/DL — SIGNIFICANT CHANGE UP (ref 6–8)
RBC # BLD: 5.72 M/UL — SIGNIFICANT CHANGE UP (ref 4.7–6.1)
SODIUM SERPL-SCNC: 144 MMOL/L — SIGNIFICANT CHANGE UP (ref 135–146)
TRIGL SERPL-MCNC: 98 MG/DL — SIGNIFICANT CHANGE UP
WBC # BLD: 9.47 K/UL — SIGNIFICANT CHANGE UP (ref 4.8–10.8)
WBC # FLD AUTO: 9.47 K/UL — SIGNIFICANT CHANGE UP (ref 4.8–10.8)

## 2022-11-09 PROCEDURE — 74176 CT ABD & PELVIS W/O CONTRAST: CPT | Mod: 26

## 2022-11-09 PROCEDURE — 99222 1ST HOSP IP/OBS MODERATE 55: CPT

## 2022-11-09 RX ORDER — HYDROCHLOROTHIAZIDE 25 MG
25 TABLET ORAL DAILY
Refills: 0 | Status: DISCONTINUED | OUTPATIENT
Start: 2022-11-09 | End: 2022-11-10

## 2022-11-09 RX ORDER — ENOXAPARIN SODIUM 100 MG/ML
40 INJECTION SUBCUTANEOUS EVERY 24 HOURS
Refills: 0 | Status: DISCONTINUED | OUTPATIENT
Start: 2022-11-09 | End: 2022-11-10

## 2022-11-09 RX ORDER — NIFEDIPINE 30 MG
30 TABLET, EXTENDED RELEASE 24 HR ORAL DAILY
Refills: 0 | Status: DISCONTINUED | OUTPATIENT
Start: 2022-11-09 | End: 2022-11-10

## 2022-11-09 RX ORDER — DIATRIZOATE MEGLUMINE 180 MG/ML
30 INJECTION, SOLUTION INTRAVESICAL ONCE
Refills: 0 | Status: COMPLETED | OUTPATIENT
Start: 2022-11-09 | End: 2022-11-09

## 2022-11-09 RX ORDER — LANOLIN ALCOHOL/MO/W.PET/CERES
3 CREAM (GRAM) TOPICAL AT BEDTIME
Refills: 0 | Status: DISCONTINUED | OUTPATIENT
Start: 2022-11-09 | End: 2022-11-10

## 2022-11-09 RX ORDER — ACETAMINOPHEN 500 MG
650 TABLET ORAL EVERY 6 HOURS
Refills: 0 | Status: DISCONTINUED | OUTPATIENT
Start: 2022-11-09 | End: 2022-11-10

## 2022-11-09 RX ORDER — IOHEXOL 300 MG/ML
30 INJECTION, SOLUTION INTRAVENOUS ONCE
Refills: 0 | Status: DISCONTINUED | OUTPATIENT
Start: 2022-11-09 | End: 2022-11-09

## 2022-11-09 RX ADMIN — DIATRIZOATE MEGLUMINE 30 MILLILITER(S): 180 INJECTION, SOLUTION INTRAVESICAL at 16:55

## 2022-11-09 RX ADMIN — Medication 25 MILLIGRAM(S): at 05:30

## 2022-11-09 RX ADMIN — Medication 30 MILLIGRAM(S): at 05:31

## 2022-11-09 NOTE — CONSULT NOTE ADULT - ATTENDING COMMENTS
I have personally seen and examined this patient.  I have fully participated in the care of this patient.  I have reviewed all pertinent clinical information, including history, physical exam, plan and note.  I have reviewed all pertinent clinical information and reviewed all relevant imaging and diagnostic studies personally.  Corrections and edits were made wherever needed.     #LAD on CT and + quantiferon gold  NO ACTIVE SYMPTOMS OF TB- NO fever, night sweats, wt loss- actually has wt gain  NO findings in lung parenchyma- this would be VERY RARE to be TB without any B symptoms  < from: CT Angio Chest PE Protocol w/ IV Cont (10.27.22 @ 14:07) >  Mediastinal and hilar lymph nodes including a   subaortic 1.4 x 1.3 cm node (series 4 image 73), left lower paratracheal   1.4 x 1.0 cm node (series 4 image 78) and right hilar 1.6 x 1.7 cm lymph   node (series 4 image 94).  Pt reports a cough for 17 yrs and he wanted it to be evaluated by a [place] physician    - plan as above    If any questions, please call or send a message on [employer] Teams  Please continue to update ID with any pertinent new laboratory or radiographic findings  Spectra [id number]

## 2022-11-09 NOTE — H&P ADULT - ATTENDING COMMENTS
No
51 yo M with PMHx of HTN   Patient is presenting after being found to be have a positive quantiferon gold    #Positive quantiferon Gold  CT Chest 10/27: Nonspecific mediastinal lymphadenopathy; No PE or evidence of intrathoracic pathology  - f/u AFB sputum x 3  - MTB PCR sputum  - f/u HIV ag/ab  - f/u ID eval    #HTN  - c/w HCTZ and nifedipine    #Hx RBBB on EKG, old  - asymptomatic  - f/u as OP with cardiology    DVT PPX, Lovenox    #Progress Note Handoff  Pending (specify): ID eval, AFB sputum  Family discussion: d/w pt and brother regarding eval for TB  Disposition: Home

## 2022-11-09 NOTE — CONSULT NOTE ADULT - ASSESSMENT
ASSESSMENT  52yMale with a PMH of HT was admitted after positive tb quantiferon test.    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   # However, the etiology for LN enlargement seen in Chest CT is still unknown  # Pt has visited [place] a tourist and will be leaving the country, would be hard to follow the work-up and could miss continuity  # HIV NR    RECOMMENDATIONS  - No need for TB rule out nor airborne, no symptoms to suggest active TB besides LAD (Without fever, B symptoms, no lung findings, would be RARE)  - Workup of LAD per primary team   - Could send histoplasma urine ag, sarcoid workup, fungitell, LDH  - The likelihood that this is a true positive TB test (PPV) is: 98%  The annual risk of development of active tuberculosis disease is estimated to be 0.1%.  The cumulative risk of active tuberculosis disease, up to the age of 80, is: 2.55%  If treated with INH, the probability of clinically significant drug-induced hepatitis is 2.3%, and the associated probability of hospitalization related to drug-induced hepatitis is 0.6%.  Treatment with rifampin is an option, but this should NOT be started in the [place] as pt will be returning to [place] and cannot follow-up  [email or web address]

## 2022-11-09 NOTE — H&P ADULT - HISTORY OF PRESENT ILLNESS
53 yo M with PMHx of  - HTN     Patient is presenting after being found to be have a positive quantiferon gold. Patient states he has had a non-productive cough for years that is episodic in nature, but denies any change in cough or sputum production. Endorses that whole family at home has similar intermittent cough, but no new recent sick contacts. Patient reports that he currently has no fever but endorses fever back in [facility] up to 39 Celius ~ 102.2 F. Recently traveled from [facility] 10/25 and presented 10/26 for generalized weakness and body aches. Patient was admitted due to RBBB on EKG, but discharged with cardio follow up. Patient denies any other symptoms today including headache, vision changes, fever, CP, SOB, NVD, weight loss, fatigue, night sweats. Does not know if he received TB vaccination as a child. CT chest done on 10/27 showed subaortic, perihilar and peritracheal LN of unknown significance.  In the ED patient's labs were unremarkable, he was only given 2 g of Magnesium.

## 2022-11-09 NOTE — H&P ADULT - NSHPLABSRESULTS_GEN_ALL_CORE
Complete Blood Count + Automated Diff (11.08.22 @ 12:39)   WBC Count: 8.75 K/uL   RBC Count: 5.45 M/uL   Hemoglobin: 16.6 g/dL   Hematocrit: 47.2 %   Mean Cell Volume: 86.6 fL   Mean Cell Hemoglobin: 30.5 pg   Mean Cell Hemoglobin Conc: 35.2 g/dL   Red Cell Distrib Width: 11.9 %   Platelet Count - Automated: 193 K/uL   Auto Neutrophil #: 6.82 K/uL   Auto Lymphocyte #: 1.19 K/uL   Auto Monocyte #: 0.49 K/uL   Auto Eosinophil #: 0.18 K/uL   Auto Basophil #: 0.04 K/uL   Auto Neutrophil %: 77.9: Differential percentages must be correlated with absolute numbers for   clinical significance. %   Auto Lymphocyte %: 13.6 %   Auto Monocyte %: 5.6 %   Auto Eosinophil %: 2.1 %   Auto Basophil %: 0.5 %   Auto Immature Granulocyte %: 0.3: (Includes meta, myelo and promyelocytes). Mild elevations in immature   granulocytes may be seen with many inflammatory processes and pregnancy;   clinical correlation suggested. %   Nucleated RBC: 0 /100 WBCs     Comprehensive Metabolic Panel (11.08.22 @ 12:39)   Sodium, Serum: 147 mmol/L   Potassium, Serum: 4.4 mmol/L   Chloride, Serum: 105 mmol/L   Carbon Dioxide, Serum: 29 mmol/L   Anion Gap, Serum: 13 mmol/L   Blood Urea Nitrogen, Serum: 20 mg/dL   Creatinine, Serum: 0.9 mg/dL   Glucose, Serum: 90 mg/dL   Calcium, Total Serum: 10.0 mg/dL   Protein Total, Serum: 7.6 g/dL   Albumin, Serum: 4.6 g/dL   Bilirubin Total, Serum: 0.3 mg/dL   Alkaline Phosphatase, Serum: 63 U/L   Aspartate Aminotransferase (AST/SGOT): 19 U/L   Alanine Aminotransferase (ALT/SGPT): 14 U/L   eGFR: 101: The estimated glomerular filtration rate (eGFR) is calculated using the   2021 CKD-EPI creatinine equation, which does not have a coefficient for   race and is validated in individuals 18 years of age and older (N Engl J   Med 2021; 385:3401-4943). Creatinine-based eGFR may be inaccurate in   various situations including but not limited to extremes of muscle mass,   altered dietary protein intake, or medications that affect renal tubular   creatinine secretion. mL/min/1.73m2 < from: Xray Chest 1 View-PORTABLE IMMEDIATE (Xray Chest 1 View-PORTABLE IMMEDIATE .) (11.08.22 @ 12:47) >      Impression:    No radiographic evidence of acute cardiopulmonary disease.    < end of copied text >

## 2022-11-09 NOTE — H&P ADULT - NSHPPHYSICALEXAM_GEN_ALL_CORE
GA: alert oriented   Heart: normal s1s2  Lungs: clear air sounds  Abdomen: soft non tender  LE: no edema

## 2022-11-09 NOTE — CONSULT NOTE ADULT - SUBJECTIVE AND OBJECTIVE BOX
LEXI GORMAN  52y, Male  Allergy: fish (Flatulence)  No Known Drug Allergies  peanuts (Flatulence)      CHIEF COMPLAINT: Referred by Doctor for positive Quantiferon (09 Nov 2022 00:19)      HPI:  52yMale with a past medical history of HTN,presented after being found to be have a positive quantiferon gold. Patient states he has had a non-productive cough for years that is episodic in nature, but denies any change in cough or sputum production. Endorses that whole family at home has similar intermittent cough, but no new recent sick contacts. Patient reports that he currently has no fever but endorses fever back in [facility] up to 39 Celius ~ 102.2 F. Recently traveled from [facility] 10/25 and presented 10/26 for generalized weakness and body aches. Patient was admitted due to RBBB on EKG, but discharged with cardio follow up. Patient denies any other symptoms today including headache, vision changes, fever, CP, SOB, NVD, weight loss, fatigue, night sweats. Does not know if he received TB vaccination as a child. CT chest done on 10/27 showed subaortic, perihilar and peritracheal LN of unknown significance.    Infectious Diseases History:  Old Micro Data/Cultures:     FAMILY HISTORY:  No pertinent family history in first degree relatives      PAST MEDICAL & SURGICAL HISTORY:  Hemorrhoid      HTN (hypertension)      No significant past surgical history    wSOCIAL HISTORY  Social History:  none smoker  no alcohol consumption (09 Nov 2022 00:19)      Recent Travel:  Other Exposures:     ROS  General: Denies rigors, nightsweats  HEENT: Denies headache, rhinorrhea, sore throat, eye pain  CV: Denies CP, palpitations  PULM: Denies wheezing, hemoptysis  GI: Denies hematemesis, hematochezia, melena  : Denies discharge, hematuria  MSK: Denies arthralgias, myalgias  SKIN: Denies rash, lesions  NEURO: Denies paresthesias, weakness  PSYCH: Denies depression, anxiety    VITALS:  T(F): 97.8, Max: 98.9 (11-09-22 @ 05:25)  HR: 75  BP: 140/90  RR: 18Vital Signs Last 24 Hrs  T(C): 36.6 (09 Nov 2022 07:19), Max: 37.2 (09 Nov 2022 05:25)  T(F): 97.8 (09 Nov 2022 07:19), Max: 98.9 (09 Nov 2022 05:25)  HR: 75 (09 Nov 2022 07:19) (74 - 75)  BP: 140/90 (09 Nov 2022 07:19) (135/66 - 140/90)  BP(mean): --  RR: 18 (09 Nov 2022 07:19) (16 - 18)  SpO2: 99% (09 Nov 2022 07:19) (99% - 99%)    Parameters below as of 09 Nov 2022 07:19  Patient On (Oxygen Delivery Method): room air        PHYSICAL EXAM:  CNS - N  RS - N  CVS - N  Abdomen -  N    TESTS & MEASUREMENTS:                        17.4   9.47  )-----------( 210      ( 09 Nov 2022 07:35 )             48.3     11-09    144  |  102  |  12  ----------------------------<  92  4.1   |  29  |  0.9    Ca    9.9      09 Nov 2022 07:35    TPro  7.8  /  Alb  4.8  /  TBili  0.7  /  DBili  x   /  AST  20  /  ALT  15  /  AlkPhos  67  11-09      LIVER FUNCTIONS - ( 09 Nov 2022 07:35 )  Alb: 4.8 g/dL / Pro: 7.8 g/dL / ALK PHOS: 67 U/L / ALT: 15 U/L / AST: 20 U/L / GGT: x           INFECTIOUS DISEASES TESTING  Rapid HIV-1/2 Antibody: Nonreact (11-09-22 @ 07:35)      RADIOLOGY & ADDITIONAL TESTS:  I have personally reviewed the last available Chest xray  CXR      CT      CARDIOLOGY TESTING  12 Lead ECG:   Ventricular Rate 75 BPM    Atrial Rate 75 BPM    P-R Interval 156 ms    QRS Duration 132 ms    Q-T Interval 450 ms    QTC Calculation(Bazett) 502 ms    P Axis 71 degrees    R Axis 92 degrees    T Axis 52 degrees    Diagnosis Line Normal sinus rhythm  Right atrial enlargement  Right bundle branch block  T wave abnormality, consider lateral ischemia  Abnormal ECG    Confirmed by nghia hinkle ([id number]) on 11/8/2022 3:47:21 PM (11-08-22 @ 13:09)  12 Lead ECG:   Ventricular Rate 66 BPM    Atrial Rate 66 BPM    P-R Interval 162 ms    QRS Duration 134 ms    Q-T Interval 448 ms    QTC Calculation(Bazett) 469 ms    P Axis 54 degrees    R Axis 97 degrees    T Axis 34 degrees    Diagnosis Line Normal sinus rhythm  Right bundle branch block  ST elevation consider anterior injury or acute infarct  ** ** ACUTE MI / STEMI ** **  Abnormal ECG    Confirmed by Joao Dos Santos ([id number]) on 10/27/2022 8:04:14 AM (10-26-22 @ 17:11)      All available historical records have been reviewed    MEDICATIONS  enoxaparin Injectable 40  hydrochlorothiazide 25  NIFEdipine XL 30      ANTIBIOTICS:      All available historical data has been reviewed    ASSESSMENT  52yMale with a PMH of HT was admitted after positive tb quantiferon test.    IMPRESSION  # Pt has no clinical features or classic B symptoms suggestive of active Tb;   # With the current presentation and positive quantiferon test, he is a case of Latent Tb.   # However, the etiology for LN enlargement seen in Chest CT is still unknown and needs evaluation   # [place] visited [place] and will be leaving the country, would be hard to follow the     RECOMMENDATIONS  - AFB x 3   - CT abdomen and pelvis - to look for LN enlargement   - Biopsy of the LN in the chest to rule out Active Tb - Pulm eval for the same - discuss need for Broncoscopy.  - Urine Histoplasma antigen   - Fungitel  - ACE to evaluate Sarcoidosis.   - Hep B and Hep C serologies   - LDH   - The above would be the ideal work-up, but decision needs to be made on work up here vs home country as the patient says he's here as a tourist and would be going back to his country  - He can travel in the flight and wouldn't require isolation as he is not active Tb.    This is a pended note. All final recommendations to follow pending discussion with ID Attending    LEXI GORMAN  52y, Male  Allergy: fish (Flatulence)  No Known Drug Allergies  peanuts (Flatulence)      CHIEF COMPLAINT: Referred by Doctor for positive Quantiferon (09 Nov 2022 00:19)      HPI:  52yMale with a past medical history of HTN,presented after being found to be have a positive quantiferon gold. Patient states he has had a non-productive cough for years that is episodic in nature, but denies any change in cough or sputum production. Endorses that whole family at home has similar intermittent cough, but no new recent sick contacts. Patient reports that he currently has no fever but endorses fever back in [facility] up to 39 Celius ~ 102.2 F. Recently traveled from [facility] 10/25 and presented 10/26 for generalized weakness and body aches. Patient was admitted due to RBBB on EKG, but discharged with cardio follow up. Patient denies any other symptoms today including headache, vision changes, fever, CP, SOB, NVD, weight loss, fatigue, night sweats. Does not know if he received TB vaccination as a child. CT chest done on 10/27 showed subaortic, perihilar and peritracheal LN of unknown significance.    Infectious Diseases History:  Old Micro Data/Cultures:     FAMILY HISTORY:  No pertinent family history in first degree relatives      PAST MEDICAL & SURGICAL HISTORY:  Hemorrhoid      HTN (hypertension)      No significant past surgical history    wSOCIAL HISTORY  Social History:  none smoker  no alcohol consumption (09 Nov 2022 00:19)      Recent Travel:  Other Exposures:     ROS  General: Denies rigors, nightsweats  HEENT: Denies headache, rhinorrhea, sore throat, eye pain  CV: Denies CP, palpitations  PULM: Denies wheezing, hemoptysis  GI: Denies hematemesis, hematochezia, melena  : Denies discharge, hematuria  MSK: Denies arthralgias, myalgias  SKIN: Denies rash, lesions  NEURO: Denies paresthesias, weakness  PSYCH: Denies depression, anxiety    VITALS:  T(F): 97.8, Max: 98.9 (11-09-22 @ 05:25)  HR: 75  BP: 140/90  RR: 18Vital Signs Last 24 Hrs  T(C): 36.6 (09 Nov 2022 07:19), Max: 37.2 (09 Nov 2022 05:25)  T(F): 97.8 (09 Nov 2022 07:19), Max: 98.9 (09 Nov 2022 05:25)  HR: 75 (09 Nov 2022 07:19) (74 - 75)  BP: 140/90 (09 Nov 2022 07:19) (135/66 - 140/90)  BP(mean): --  RR: 18 (09 Nov 2022 07:19) (16 - 18)  SpO2: 99% (09 Nov 2022 07:19) (99% - 99%)    Parameters below as of 09 Nov 2022 07:19  Patient On (Oxygen Delivery Method): room air        PHYSICAL EXAM:  CNS - N  RS - N  CVS - N  Abdomen -  N    TESTS & MEASUREMENTS:                        17.4   9.47  )-----------( 210      ( 09 Nov 2022 07:35 )             48.3     11-09    144  |  102  |  12  ----------------------------<  92  4.1   |  29  |  0.9    Ca    9.9      09 Nov 2022 07:35    TPro  7.8  /  Alb  4.8  /  TBili  0.7  /  DBili  x   /  AST  20  /  ALT  15  /  AlkPhos  67  11-09      LIVER FUNCTIONS - ( 09 Nov 2022 07:35 )  Alb: 4.8 g/dL / Pro: 7.8 g/dL / ALK PHOS: 67 U/L / ALT: 15 U/L / AST: 20 U/L / GGT: x           INFECTIOUS DISEASES TESTING  Rapid HIV-1/2 Antibody: Nonreact (11-09-22 @ 07:35)      RADIOLOGY & ADDITIONAL TESTS:  I have personally reviewed the last available Chest xray  CXR      CT      CARDIOLOGY TESTING  12 Lead ECG:   Ventricular Rate 75 BPM    Atrial Rate 75 BPM    P-R Interval 156 ms    QRS Duration 132 ms    Q-T Interval 450 ms    QTC Calculation(Bazett) 502 ms    P Axis 71 degrees    R Axis 92 degrees    T Axis 52 degrees    Diagnosis Line Normal sinus rhythm  Right atrial enlargement  Right bundle branch block  T wave abnormality, consider lateral ischemia  Abnormal ECG    Confirmed by nghia hinkle ([id number]) on 11/8/2022 3:47:21 PM (11-08-22 @ 13:09)  12 Lead ECG:   Ventricular Rate 66 BPM    Atrial Rate 66 BPM    P-R Interval 162 ms    QRS Duration 134 ms    Q-T Interval 448 ms    QTC Calculation(Bazett) 469 ms    P Axis 54 degrees    R Axis 97 degrees    T Axis 34 degrees    Diagnosis Line Normal sinus rhythm  Right bundle branch block  ST elevation consider anterior injury or acute infarct  ** ** ACUTE MI / STEMI ** **  Abnormal ECG    Confirmed by Joao Dos Santos ([id number]) on 10/27/2022 8:04:14 AM (10-26-22 @ 17:11)      All available historical records have been reviewed    MEDICATIONS  enoxaparin Injectable 40  hydrochlorothiazide 25  NIFEdipine XL 30      ANTIBIOTICS:      All available historical data has been reviewed    ASSESSMENT  52yMale with a PMH of HT was admitted after positive tb quantiferon test.    IMPRESSION  # Pt has no clinical features or classic B symptoms suggestive of active Tb;   # With the current presentation and positive quantiferon test, he is a case of Latent Tb.   # However, the etiology for LN enlargement seen in Chest CT is still unknown and needs evaluation   # Pt [place] visited [place] a [place] and will be leaving the country, would be hard to follow the work-up and could miss continuity    RECOMMENDATIONS  - AFB x 3   - CT abdomen and pelvis - to look for LN enlargement   - Biopsy of the LN in the chest to rule out Active Tb - Pulm eval for the same - discuss need for Broncoscopy.  - Urine Histoplasma antigen   - Fungitel  - ACE to evaluate Sarcoidosis.   - Hep B and Hep C serologies   - LDH   - The above would be the ideal work-up, but decision needs to be made on work up here vs home country as the patient says he's here as a tourist and would be going back to his country  - He can travel in the flight and wouldn't require isolation as he is not active Tb.    This is a pended note. All final recommendations to follow pending discussion with ID Attending    LEXI GORMAN  52y, Male  Allergy: fish (Flatulence)  No Known Drug Allergies  peanuts (Flatulence)      CHIEF COMPLAINT: Referred by Doctor for positive Quantiferon (09 Nov 2022 00:19)      HPI:  52yMale with a past medical history of HTN,presented after being found to be have a positive quantiferon gold. Patient states he has had a non-productive cough for years that is episodic in nature, but denies any change in cough or sputum production. Endorses that whole family at home has similar intermittent cough, but no new recent sick contacts. Patient reports that he currently has no fever but endorses fever back in [facility] up to 39 Celius ~ 102.2 F. Recently traveled from [facility] 10/25 and presented 10/26 for generalized weakness and body aches. Patient was admitted due to RBBB on EKG, but discharged with cardio follow up. Patient denies any other symptoms today including headache, vision changes, fever, CP, SOB, NVD, weight loss, fatigue, night sweats. Does not know if he received TB vaccination as a child. CT chest done on 10/27 showed subaortic, perihilar and peritracheal LN of unknown significance.    Infectious Diseases History:  Old Micro Data/Cultures:     FAMILY HISTORY:  No pertinent family history in first degree relatives      PAST MEDICAL & SURGICAL HISTORY:  Hemorrhoid      HTN (hypertension)      No significant past surgical history    wSOCIAL HISTORY  Social History:  none smoker  no alcohol consumption (09 Nov 2022 00:19)      Recent Travel:  Other Exposures:     ROS  General: Denies rigors, nightsweats  HEENT: Denies headache, rhinorrhea, sore throat, eye pain  CV: Denies CP, palpitations  PULM: Denies wheezing, hemoptysis  GI: Denies hematemesis, hematochezia, melena  : Denies discharge, hematuria  MSK: Denies arthralgias, myalgias  SKIN: Denies rash, lesions  NEURO: Denies paresthesias, weakness  PSYCH: Denies depression, anxiety    VITALS:  T(F): 97.8, Max: 98.9 (11-09-22 @ 05:25)  HR: 75  BP: 140/90  RR: 18Vital Signs Last 24 Hrs  T(C): 36.6 (09 Nov 2022 07:19), Max: 37.2 (09 Nov 2022 05:25)  T(F): 97.8 (09 Nov 2022 07:19), Max: 98.9 (09 Nov 2022 05:25)  HR: 75 (09 Nov 2022 07:19) (74 - 75)  BP: 140/90 (09 Nov 2022 07:19) (135/66 - 140/90)  BP(mean): --  RR: 18 (09 Nov 2022 07:19) (16 - 18)  SpO2: 99% (09 Nov 2022 07:19) (99% - 99%)    Parameters below as of 09 Nov 2022 07:19  Patient On (Oxygen Delivery Method): room air        PHYSICAL EXAM:  CNS - N  RS - N  CVS - N  Abdomen -  N    TESTS & MEASUREMENTS:                        17.4   9.47  )-----------( 210      ( 09 Nov 2022 07:35 )             48.3     11-09    144  |  102  |  12  ----------------------------<  92  4.1   |  29  |  0.9    Ca    9.9      09 Nov 2022 07:35    TPro  7.8  /  Alb  4.8  /  TBili  0.7  /  DBili  x   /  AST  20  /  ALT  15  /  AlkPhos  67  11-09      LIVER FUNCTIONS - ( 09 Nov 2022 07:35 )  Alb: 4.8 g/dL / Pro: 7.8 g/dL / ALK PHOS: 67 U/L / ALT: 15 U/L / AST: 20 U/L / GGT: x           INFECTIOUS DISEASES TESTING  Rapid HIV-1/2 Antibody: Nonreact (11-09-22 @ 07:35)      RADIOLOGY & ADDITIONAL TESTS:  I have personally reviewed the last available Chest xray  CXR      CT      CARDIOLOGY TESTING  12 Lead ECG:   Ventricular Rate 75 BPM    Atrial Rate 75 BPM    P-R Interval 156 ms    QRS Duration 132 ms    Q-T Interval 450 ms    QTC Calculation(Bazett) 502 ms    P Axis 71 degrees    R Axis 92 degrees    T Axis 52 degrees    Diagnosis Line Normal sinus rhythm  Right atrial enlargement  Right bundle branch block  T wave abnormality, consider lateral ischemia  Abnormal ECG    Confirmed by nghia hinkle ([id number]) on 11/8/2022 3:47:21 PM (11-08-22 @ 13:09)  12 Lead ECG:   Ventricular Rate 66 BPM    Atrial Rate 66 BPM    P-R Interval 162 ms    QRS Duration 134 ms    Q-T Interval 448 ms    QTC Calculation(Bazett) 469 ms    P Axis 54 degrees    R Axis 97 degrees    T Axis 34 degrees    Diagnosis Line Normal sinus rhythm  Right bundle branch block  ST elevation consider anterior injury or acute infarct  ** ** ACUTE MI / STEMI ** **  Abnormal ECG    Confirmed by Joao Dos Santos ([id number]) on 10/27/2022 8:04:14 AM (10-26-22 @ 17:11)      All available historical records have been reviewed    MEDICATIONS  enoxaparin Injectable 40  hydrochlorothiazide 25  NIFEdipine XL 30      ANTIBIOTICS:      All available historical data has been reviewed    ASSESSMENT  52yMale with a PMH of HT was admitted after positive tb quantiferon test.    IMPRESSION  # Pt has no clinical features or classic B symptoms suggestive of active Tb;   # With the current presentation and positive quantiferon test, he is a case of Latent Tb.   # However, the etiology for LN enlargement seen in Chest CT is still unknown and needs evaluation   # Pt has visited [place] a [place] and will be leaving the country, would be hard to follow the work-up and could miss continuity    RECOMMENDATIONS  - AFB x 3   - CT abdomen and pelvis - to look for LN enlargement   - Biopsy of the LN in the chest to rule out Active Tb - Pulm eval for the same - discuss need for Broncoscopy.  - Urine Histoplasma antigen   - Fungitel  - ACE to evaluate Sarcoidosis.   - Hep B and Hep C serologies   - LDH, electrolytes - work up TLS    - The above would be the ideal work-up, but decision needs to be made on work up here vs home country as the patient says he's here as a tourist and would be going back to his country  - He can travel in the flight and wouldn't require isolation as he is not active Tb.  - Pt would need to receive treatment for latent Tb.    This is a pended note. All final recommendations to follow pending discussion with ID Attending    LEXI GORMAN  52y, Male  Allergy: fish (Flatulence)  No Known Drug Allergies  peanuts (Flatulence)      CHIEF COMPLAINT: Referred by Doctor for positive Quantiferon (09 Nov 2022 00:19)      HPI:  52yMale with a past medical history of HTN,presented after being found to be have a positive quantiferon gold. Patient states he has had a non-productive cough for years that is episodic in nature, but denies any change in cough or sputum production. Endorses that whole family at home has similar intermittent cough, but no new recent sick contacts. Patient reports that he currently has no fever but endorses fever back in [facility] up to 39 Celius ~ 102.2 F. Recently traveled from [facility] 10/25 and presented 10/26 for generalized weakness and body aches. Patient was admitted due to RBBB on EKG, but discharged with cardio follow up. Patient denies any other symptoms today including headache, vision changes, fever, CP, SOB, NVD, weight loss, fatigue, night sweats. Does not know if he received TB vaccination as a child. CT chest done on 10/27 showed subaortic, perihilar and peritracheal LN of unknown significance.    Infectious Diseases History:  Old Micro Data/Cultures:     FAMILY HISTORY:  No pertinent family history in first degree relatives      PAST MEDICAL & SURGICAL HISTORY:  Hemorrhoid      HTN (hypertension)      No significant past surgical history    wSOCIAL HISTORY  Social History:  none smoker  no alcohol consumption (09 Nov 2022 00:19)      Recent Travel:  Other Exposures:     ROS  General: Denies rigors, nightsweats  HEENT: Denies headache, rhinorrhea, sore throat, eye pain  CV: Denies CP, palpitations  PULM: Denies wheezing, hemoptysis  GI: Denies hematemesis, hematochezia, melena  : Denies discharge, hematuria  MSK: Denies arthralgias, myalgias  SKIN: Denies rash, lesions  NEURO: Denies paresthesias, weakness  PSYCH: Denies depression, anxiety    VITALS:  T(F): 97.8, Max: 98.9 (11-09-22 @ 05:25)  HR: 75  BP: 140/90  RR: 18Vital Signs Last 24 Hrs  T(C): 36.6 (09 Nov 2022 07:19), Max: 37.2 (09 Nov 2022 05:25)  T(F): 97.8 (09 Nov 2022 07:19), Max: 98.9 (09 Nov 2022 05:25)  HR: 75 (09 Nov 2022 07:19) (74 - 75)  BP: 140/90 (09 Nov 2022 07:19) (135/66 - 140/90)  BP(mean): --  RR: 18 (09 Nov 2022 07:19) (16 - 18)  SpO2: 99% (09 Nov 2022 07:19) (99% - 99%)    Parameters below as of 09 Nov 2022 07:19  Patient On (Oxygen Delivery Method): room air        PHYSICAL EXAM:  CNS - N  RS - N  CVS - N  Abdomen -  N    TESTS & MEASUREMENTS:                        17.4   9.47  )-----------( 210      ( 09 Nov 2022 07:35 )             48.3     11-09    144  |  102  |  12  ----------------------------<  92  4.1   |  29  |  0.9    Ca    9.9      09 Nov 2022 07:35    TPro  7.8  /  Alb  4.8  /  TBili  0.7  /  DBili  x   /  AST  20  /  ALT  15  /  AlkPhos  67  11-09      LIVER FUNCTIONS - ( 09 Nov 2022 07:35 )  Alb: 4.8 g/dL / Pro: 7.8 g/dL / ALK PHOS: 67 U/L / ALT: 15 U/L / AST: 20 U/L / GGT: x           INFECTIOUS DISEASES TESTING  Rapid HIV-1/2 Antibody: Nonreact (11-09-22 @ 07:35)      RADIOLOGY & ADDITIONAL TESTS:  I have personally reviewed the last available Chest xray  CXR      CT      CARDIOLOGY TESTING  12 Lead ECG:   Ventricular Rate 75 BPM    Atrial Rate 75 BPM    P-R Interval 156 ms    QRS Duration 132 ms    Q-T Interval 450 ms    QTC Calculation(Bazett) 502 ms    P Axis 71 degrees    R Axis 92 degrees    T Axis 52 degrees    Diagnosis Line Normal sinus rhythm  Right atrial enlargement  Right bundle branch block  T wave abnormality, consider lateral ischemia  Abnormal ECG    Confirmed by nghia hinkle ([id number]) on 11/8/2022 3:47:21 PM (11-08-22 @ 13:09)  12 Lead ECG:   Ventricular Rate 66 BPM    Atrial Rate 66 BPM    P-R Interval 162 ms    QRS Duration 134 ms    Q-T Interval 448 ms    QTC Calculation(Bazett) 469 ms    P Axis 54 degrees    R Axis 97 degrees    T Axis 34 degrees    Diagnosis Line Normal sinus rhythm  Right bundle branch block  ST elevation consider anterior injury or acute infarct  ** ** ACUTE MI / STEMI ** **  Abnormal ECG    Confirmed by Joao Dos Santos ([id number]) on 10/27/2022 8:04:14 AM (10-26-22 @ 17:11)      All available historical records have been reviewed    MEDICATIONS  enoxaparin Injectable 40  hydrochlorothiazide 25  NIFEdipine XL 30      ANTIBIOTICS:      All available historical data has been reviewed

## 2022-11-10 ENCOUNTER — TRANSCRIPTION ENCOUNTER (OUTPATIENT)
Age: 52
End: 2022-11-10

## 2022-11-10 ENCOUNTER — APPOINTMENT (OUTPATIENT)
Dept: INTERNAL MEDICINE | Facility: CLINIC | Age: 52
End: 2022-11-10

## 2022-11-10 VITALS
OXYGEN SATURATION: 98 % | SYSTOLIC BLOOD PRESSURE: 143 MMHG | RESPIRATION RATE: 18 BRPM | TEMPERATURE: 97 F | DIASTOLIC BLOOD PRESSURE: 89 MMHG | HEART RATE: 78 BPM

## 2022-11-10 PROCEDURE — 99238 HOSP IP/OBS DSCHRG MGMT 30/<: CPT

## 2022-11-10 RX ORDER — SIMETHICONE 80 MG/1
1 TABLET, CHEWABLE ORAL
Qty: 0 | Refills: 0 | DISCHARGE

## 2022-11-10 RX ORDER — FLUTICASONE PROPIONATE AND SALMETEROL 50; 250 UG/1; UG/1
1 POWDER ORAL; RESPIRATORY (INHALATION)
Qty: 1 | Refills: 0
Start: 2022-11-10 | End: 2022-12-09

## 2022-11-10 RX ORDER — FLUTICASONE PROPIONATE 220 MCG
2 AEROSOL WITH ADAPTER (GRAM) INHALATION
Qty: 1 | Refills: 0
Start: 2022-11-10

## 2022-11-10 RX ADMIN — Medication 30 MILLIGRAM(S): at 06:02

## 2022-11-10 RX ADMIN — Medication 25 MILLIGRAM(S): at 06:02

## 2022-11-10 NOTE — DISCHARGE NOTE PROVIDER - HOSPITAL COURSE
51 yo M with PMHx of  - HTN     Patient is presenting after being found to be have a positive quantiferon gold. Patient states he has had a non-productive cough for years that is episodic in nature, but denies any change in cough or sputum production. Endorses that whole family at home has similar intermittent cough, but no new recent sick contacts. Patient reports that he currently has no fever but endorses fever back in [facility] up to 39 Celius ~ 102.2 F. Recently traveled from [facility] 10/25 and presented 10/26 for generalized weakness and body aches. Patient was admitted due to RBBB on EKG, but discharged with cardio follow up. Patient denies any other symptoms today including headache, vision changes, fever, CP, SOB, NVD, weight loss, fatigue, night sweats. Does not know if he received TB vaccination as a child. CT chest done on 10/27 showed subaortic, perihilar and peritracheal LN of unknown significance.    CT Scan was negative for cavitary lesions and/or pneumonia. Evaluated by ID and active TB ruled out. Pt will need tx for latent TB, but plan to have it done back at his home country. For chronic cough, will do trial of steroid inhaler.

## 2022-11-10 NOTE — PROGRESS NOTE ADULT - ASSESSMENT
ASSESSMENT  52yMale from [place] with a PMH of HT was admitted after positive tb quantiferon test.    IMPRESSION  #LAD on CT and + quantiferon gold  NO ACTIVE SYMPTOMS OF TB- NO fever, night sweats, wt loss- actually has wt gain  NO findings in lung parenchyma- this would be VERY RARE to be TB without any B symptoms  < from: CT Angio Chest PE Protocol w/ IV Cont (10.27.22 @ 14:07) >  Mediastinal and hilar lymph nodes including a   subaortic 1.4 x 1.3 cm node (series 4 image 73), left lower paratracheal   1.4 x 1.0 cm node (series 4 image 78) and right hilar 1.6 x 1.7 cm lymph   node (series 4 image 94).  Pt reports a cough for 17 yrs and he wanted it to be evaluated by a [place] physician  #HIV NR    RECOMMENDATIONS  - No need for TB rule out nor airborne, no symptoms to suggest active TB besides LAD (Without fever, B symptoms, no lung findings, would be RARE)  - Workup of LAD per primary team   - Could send histoplasma urine ag, sarcoid workup, fungitell, LDH  - The likelihood that this is a true positive TB test (PPV) is: 98%  The annual risk of development of active tuberculosis disease is estimated to be 0.1%.  The cumulative risk of active tuberculosis disease, up to the age of 80, is: 2.55%  If treated with INH, the probability of clinically significant drug-induced hepatitis is 2.3%, and the associated probability of hospitalization related to drug-induced hepatitis is 0.6%.  Treatment with rifampin is an option, but this should NOT be started in the [place] as pt will be returning to [place] and cannot follow-up  [email or web address]    If any questions, please call or send a message on [employer] Teams  Please continue to update ID with any pertinent new laboratory or radiographic findings  Spectra [id number]

## 2022-11-10 NOTE — DISCHARGE NOTE PROVIDER - NSDCMRMEDTOKEN_GEN_ALL_CORE_FT
AirDuo RespiClick 113 mcg-14 mcg/inh inhalation powder: 1 inhaler(s) inhaled 2 times a day   Flovent HFA 44 mcg/inh inhalation aerosol: 2 puff(s) inhaled 4 times a day, As Needed -for bronchospasm   hydroCHLOROthiazide 25 mg oral tablet: 1 tab(s) orally once a day  NIFEdipine 30 mg oral tablet, extended release: 1 tab(s) orally once a day

## 2022-11-10 NOTE — PROGRESS NOTE ADULT - SUBJECTIVE AND OBJECTIVE BOX
LEXI GORMAN  52y, Male  Allergy: fish (Flatulence)  No Known Drug Allergies  peanuts (Flatulence)      LOS  2d    CHIEF COMPLAINT: Referred by Doctor for positive Quantiferon (10 Nov 2022 11:38)      INTERVAL EVENTS/HPI  - No acute events overnight  - T(F): , Max: 98 (11-10-22 @ 06:00)  - Denies any worsening symptoms  - Tolerating medication  - WBC Count: 9.47 (11-09-22 @ 07:35)  WBC Count: 8.75 (11-08-22 @ 12:39)     - Creatinine, Serum: 0.9 (11-09-22 @ 07:35)       ROS  General: Denies rigors, nightsweats  HEENT: Denies headache, rhinorrhea, sore throat, eye pain  CV: Denies CP, palpitations  PULM: Denies wheezing, hemoptysis  GI: Denies hematemesis, hematochezia, melena  : Denies discharge, hematuria  MSK: Denies arthralgias, myalgias  SKIN: Denies rash, lesions  NEURO: Denies paresthesias, weakness  PSYCH: Denies depression, anxiety    VITALS:  T(F): 96.6, Max: 98 (11-10-22 @ 06:00)  HR: 78  BP: 143/89  RR: 18Vital Signs Last 24 Hrs  T(C): 35.9 (10 Nov 2022 07:41), Max: 36.7 (10 Nov 2022 06:00)  T(F): 96.6 (10 Nov 2022 07:41), Max: 98 (10 Nov 2022 06:00)  HR: 78 (10 Nov 2022 07:41) (71 - 78)  BP: 143/89 (10 Nov 2022 07:41) (132/75 - 143/89)  BP(mean): --  RR: 18 (10 Nov 2022 07:41) (18 - 18)  SpO2: 98% (10 Nov 2022 07:41) (98% - 99%)    Parameters below as of 10 Nov 2022 07:41  Patient On (Oxygen Delivery Method): room air        PHYSICAL EXAM:  Gen: NAD, resting in bed  HEENT: Normocephalic, atraumatic  Neck: supple, no lymphadenopathy  CV: Regular rate & regular rhythm  Lungs: decreased BS at bases, no fremitus  Abdomen: Soft, BS present  Ext: Warm, well perfused  Neuro: non focal, awake  Skin: no rash, no erythema  Lines: no phlebitis    FH: Non-contributory  Social Hx: Non-contributory    TESTS & MEASUREMENTS:                        17.4   9.47  )-----------( 210      ( 09 Nov 2022 07:35 )             48.3     11-09    144  |  102  |  12  ----------------------------<  92  4.1   |  29  |  0.9    Ca    9.9      09 Nov 2022 07:35    TPro  7.8  /  Alb  4.8  /  TBili  0.7  /  DBili  x   /  AST  20  /  ALT  15  /  AlkPhos  67  11-09      LIVER FUNCTIONS - ( 09 Nov 2022 07:35 )  Alb: 4.8 g/dL / Pro: 7.8 g/dL / ALK PHOS: 67 U/L / ALT: 15 U/L / AST: 20 U/L / GGT: x               Culture - Blood (collected 11-08-22 @ 12:39)  Source: .Blood Blood-Peripheral  Preliminary Report (11-09-22 @ 23:02):    No growth to date.    Culture - Blood (collected 11-08-22 @ 12:39)  Source: .Blood Blood-Peripheral  Preliminary Report (11-09-22 @ 23:02):    No growth to date.            INFECTIOUS DISEASES TESTING  Rapid HIV-1/2 Antibody: Nonreact (11-09-22 @ 07:35)  COVID-19 PCR: NotDetec (11-08-22 @ 12:39)  Procalcitonin, Serum: <0.02 (10-28-22 @ 19:36)  Rapid RVP Result: NotDetec (10-28-22 @ 14:15)  COVID-19 PCR: NotDetec (10-26-22 @ 17:41)      INFLAMMATORY MARKERS      RADIOLOGY & ADDITIONAL TESTS:  I have personally reviewed the last available Chest xray  CXR      CT      CARDIOLOGY TESTING  12 Lead ECG:   Ventricular Rate 75 BPM    Atrial Rate 75 BPM    P-R Interval 156 ms    QRS Duration 132 ms    Q-T Interval 450 ms    QTC Calculation(Bazett) 502 ms    P Axis 71 degrees    R Axis 92 degrees    T Axis 52 degrees    Diagnosis Line Normal sinus rhythm  Right atrial enlargement  Right bundle branch block  T wave abnormality, consider lateral ischemia  Abnormal ECG    Confirmed by nghia hinkle ([id number]) on 11/8/2022 3:47:21 PM (11-08-22 @ 13:09)      MEDICATIONS  enoxaparin Injectable 40 SubCutaneous every 24 hours  hydrochlorothiazide 25 Oral daily  NIFEdipine XL 30 Oral daily      WEIGHT  Weight (kg): 82.1 (10-29-22 @ 08:55)      ANTIBIOTICS:      All available historical records have been reviewed

## 2022-11-10 NOTE — DISCHARGE NOTE PROVIDER - NSDCCPCAREPLAN_GEN_ALL_CORE_FT
PRINCIPAL DISCHARGE DIAGNOSIS  Diagnosis: Cough  Assessment and Plan of Treatment: Trial of steroid inhaler twice a day to see if symptoms improve. Use flovent as needed four times a day. Follow up with your pulmonary docter for further evaluation of lymphadenopathy.      SECONDARY DISCHARGE DIAGNOSES  Diagnosis: Prolonged QT interval  Assessment and Plan of Treatment:

## 2022-11-10 NOTE — DISCHARGE NOTE PROVIDER - NSDCFUSCHEDAPPT_GEN_ALL_CORE_FT
Jose Merchant  [facility] Physician [facility]  [address]  Scheduled Appointment: 11/15/2022

## 2022-11-10 NOTE — DISCHARGE NOTE NURSING/CASE MANAGEMENT/SOCIAL WORK - NSDCPEFALRISK_GEN_ALL_CORE
For information on Fall & Injury Prevention, visit: [email or web address] OR  [email or web address] OR  https://www.cdc.gov/steadi/patient.html

## 2022-11-10 NOTE — DISCHARGE NOTE NURSING/CASE MANAGEMENT/SOCIAL WORK - PATIENT PORTAL LINK FT
You can access the FollowMyHealth Patient Portal offered by [facility] by registering at the following website: [email or web address]. By joining [employer]’s FollowMyHealth portal, you will also be able to view your health information using other applications (apps) compatible with our system.

## 2022-11-11 LAB
ACE SERPL-CCNC: 38 U/L — SIGNIFICANT CHANGE UP (ref 14–82)
HAV IGM SER-ACNC: SIGNIFICANT CHANGE UP
HBV CORE IGM SER-ACNC: SIGNIFICANT CHANGE UP
HBV SURFACE AG SER-ACNC: REACTIVE
HCV AB S/CO SERPL IA: 0.1 S/CO — SIGNIFICANT CHANGE UP (ref 0–0.99)
HCV AB SERPL-IMP: SIGNIFICANT CHANGE UP

## 2022-11-12 LAB — FUNGITELL: <31 PG/ML — SIGNIFICANT CHANGE UP

## 2022-11-13 LAB
CULTURE RESULTS: SIGNIFICANT CHANGE UP
CULTURE RESULTS: SIGNIFICANT CHANGE UP
SPECIMEN SOURCE: SIGNIFICANT CHANGE UP
SPECIMEN SOURCE: SIGNIFICANT CHANGE UP

## 2022-11-13 NOTE — CHART NOTE - NSCHARTNOTEFT_GEN_A_CORE
Notified patient's brother over phone of hepatitis B surface antigen positive results. Hep B core IgM negative, so likely chronic infection but does need further workup with PCP and potential treatment.

## 2022-11-15 ENCOUNTER — APPOINTMENT (OUTPATIENT)
Dept: SURGERY | Facility: CLINIC | Age: 52
End: 2022-11-15

## 2022-11-15 DIAGNOSIS — R05.9 COUGH, UNSPECIFIED: ICD-10-CM

## 2022-11-15 DIAGNOSIS — R53.83 OTHER FATIGUE: ICD-10-CM

## 2022-11-15 DIAGNOSIS — R59.1 GENERALIZED ENLARGED LYMPH NODES: ICD-10-CM

## 2022-11-15 DIAGNOSIS — Z00.00 ENCOUNTER FOR GENERAL ADULT MEDICAL EXAMINATION WITHOUT ABNORMAL FINDINGS: ICD-10-CM

## 2022-11-24 DIAGNOSIS — I10 ESSENTIAL (PRIMARY) HYPERTENSION: ICD-10-CM

## 2022-11-24 DIAGNOSIS — Z91.010 ALLERGY TO PEANUTS: ICD-10-CM

## 2022-11-24 DIAGNOSIS — Z20.822 CONTACT WITH AND (SUSPECTED) EXPOSURE TO COVID-19: ICD-10-CM

## 2022-11-24 DIAGNOSIS — R05.9 COUGH, UNSPECIFIED: ICD-10-CM

## 2022-11-24 DIAGNOSIS — I45.10 UNSPECIFIED RIGHT BUNDLE-BRANCH BLOCK: ICD-10-CM

## 2022-11-24 DIAGNOSIS — R94.31 ABNORMAL ELECTROCARDIOGRAM [ECG] [EKG]: ICD-10-CM

## 2022-11-24 DIAGNOSIS — Z22.7 LATENT TUBERCULOSIS: ICD-10-CM

## 2022-11-24 DIAGNOSIS — Z91.018 ALLERGY TO OTHER FOODS: ICD-10-CM

## 2022-11-24 DIAGNOSIS — R59.9 ENLARGED LYMPH NODES, UNSPECIFIED: ICD-10-CM

## 2023-11-09 NOTE — H&P ADULT - ASSESSMENT
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
